# Patient Record
Sex: FEMALE | Race: WHITE | NOT HISPANIC OR LATINO | Employment: UNEMPLOYED | URBAN - METROPOLITAN AREA
[De-identification: names, ages, dates, MRNs, and addresses within clinical notes are randomized per-mention and may not be internally consistent; named-entity substitution may affect disease eponyms.]

---
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
## 2020-12-23 ENCOUNTER — HISTORICAL (OUTPATIENT)
Dept: LAB | Facility: HOSPITAL | Age: 20
End: 2020-12-23

## 2020-12-23 LAB
ABS NEUT (OLG): 2.3 X10(3)/MCL (ref 2.1–9.2)
ALBUMIN SERPL-MCNC: 3.6 GM/DL (ref 3.5–5)
ALBUMIN/GLOB SERPL: 1.1 RATIO (ref 1.1–2)
ALP SERPL-CCNC: 60 UNIT/L (ref 40–150)
ALT SERPL-CCNC: 12 UNIT/L (ref 0–55)
AST SERPL-CCNC: 15 UNIT/L (ref 5–34)
BASOPHILS # BLD AUTO: 0 X10(3)/MCL (ref 0–0.2)
BASOPHILS NFR BLD AUTO: 1 %
BILIRUB SERPL-MCNC: 0.4 MG/DL
BILIRUBIN DIRECT+TOT PNL SERPL-MCNC: 0.1 MG/DL (ref 0–0.5)
BILIRUBIN DIRECT+TOT PNL SERPL-MCNC: 0.3 MG/DL (ref 0–0.8)
BUN SERPL-MCNC: 6.9 MG/DL (ref 7–18.7)
CALCIUM SERPL-MCNC: 9.2 MG/DL (ref 8.4–10.2)
CHLORIDE SERPL-SCNC: 107 MMOL/L (ref 98–107)
CHOLEST SERPL-MCNC: 197 MG/DL
CHOLEST/HDLC SERPL: 4 {RATIO} (ref 0–5)
CO2 SERPL-SCNC: 24 MMOL/L (ref 22–29)
CREAT SERPL-MCNC: 0.76 MG/DL (ref 0.55–1.02)
EOSINOPHIL # BLD AUTO: 0.2 X10(3)/MCL (ref 0–0.9)
EOSINOPHIL NFR BLD AUTO: 5 %
ERYTHROCYTE [DISTWIDTH] IN BLOOD BY AUTOMATED COUNT: 13.2 % (ref 11.5–17)
GLOBULIN SER-MCNC: 3.2 GM/DL (ref 2.4–3.5)
GLUCOSE SERPL-MCNC: 95 MG/DL (ref 74–100)
HCT VFR BLD AUTO: 40.9 % (ref 37–47)
HDLC SERPL-MCNC: 45 MG/DL (ref 35–60)
HGB BLD-MCNC: 13 GM/DL (ref 12–16)
IMM GRANULOCYTES # BLD AUTO: 0.01 % (ref 0–0.02)
IMM GRANULOCYTES NFR BLD AUTO: 0.2 % (ref 0–0.43)
LDLC SERPL CALC-MCNC: 122 MG/DL (ref 50–140)
LYMPHOCYTES # BLD AUTO: 1.8 X10(3)/MCL (ref 0.6–4.6)
LYMPHOCYTES NFR BLD AUTO: 38 %
MCH RBC QN AUTO: 28 PG (ref 27–31)
MCHC RBC AUTO-ENTMCNC: 31.8 GM/DL (ref 33–36)
MCV RBC AUTO: 88.1 FL (ref 80–94)
MONOCYTES # BLD AUTO: 0.3 X10(3)/MCL (ref 0.1–1.3)
MONOCYTES NFR BLD AUTO: 6 %
NEUTROPHILS # BLD AUTO: 2.3 X10(3)/MCL (ref 1.4–7.9)
NEUTROPHILS NFR BLD AUTO: 49 %
PLATELET # BLD AUTO: 321 X10(3)/MCL (ref 130–400)
PMV BLD AUTO: 8.8 FL (ref 9.4–12.4)
POTASSIUM SERPL-SCNC: 4.3 MMOL/L (ref 3.5–5.1)
PROT SERPL-MCNC: 6.8 GM/DL (ref 6.4–8.3)
RBC # BLD AUTO: 4.64 X10(6)/MCL (ref 4.2–5.4)
SODIUM SERPL-SCNC: 141 MMOL/L (ref 136–145)
TRIGL SERPL-MCNC: 150 MG/DL (ref 37–140)
TSH SERPL-ACNC: 0.96 UIU/ML (ref 0.35–4.94)
VLDLC SERPL CALC-MCNC: 30 MG/DL
WBC # SPEC AUTO: 4.7 X10(3)/MCL (ref 4.5–11.5)

## 2021-09-28 LAB
BILIRUB SERPL-MCNC: NEGATIVE MG/DL
BLOOD URINE, POC: NORMAL
CLARITY, POC UA: CLEAR
COLOR, POC UA: NORMAL
GLUCOSE UR QL STRIP: NEGATIVE
KETONES UR QL STRIP: NEGATIVE
LEUKOCYTE EST, POC UA: NORMAL
NITRITE, POC UA: NEGATIVE
PH, POC UA: 7
POC BETA-HCG (QUAL): NEGATIVE
PROTEIN, POC: NEGATIVE
SPECIFIC GRAVITY, POC UA: 1
UROBILINOGEN, POC UA: NORMAL

## 2022-03-28 LAB
HIGH RISK HPV 16 (PRECISION): NEGATIVE
HIGH RISK HPV 18/45 (PRECISION): NEGATIVE
PAP RECOMMENDATION EXT: ABNORMAL
PAP SMEAR: ABNORMAL

## 2022-04-11 ENCOUNTER — HISTORICAL (OUTPATIENT)
Dept: ADMINISTRATIVE | Facility: HOSPITAL | Age: 22
End: 2022-04-11
Payer: COMMERCIAL

## 2022-04-28 VITALS
OXYGEN SATURATION: 99 % | SYSTOLIC BLOOD PRESSURE: 117 MMHG | DIASTOLIC BLOOD PRESSURE: 80 MMHG | WEIGHT: 174.38 LBS | HEIGHT: 66 IN | BODY MASS INDEX: 28.03 KG/M2

## 2022-05-17 RX ORDER — LISDEXAMFETAMINE DIMESYLATE 30 MG/1
CAPSULE ORAL
COMMUNITY
Start: 2022-04-12 | End: 2022-05-17 | Stop reason: SDUPTHER

## 2022-05-17 RX ORDER — LISDEXAMFETAMINE DIMESYLATE 30 MG/1
30 CAPSULE ORAL EVERY MORNING
Qty: 30 CAPSULE | Refills: 0 | Status: SHIPPED | OUTPATIENT
Start: 2022-05-17 | End: 2022-06-27 | Stop reason: SDUPTHER

## 2022-05-17 NOTE — TELEPHONE ENCOUNTER
----- Message from Sydnie Anaya MA sent at 5/17/2022 11:40 AM CDT -----  Regarding: refill  Patient is requesting a refill on Vyvanse 30 mg at [facility].  She has a scheduled telemedicine visit on 5/23/22.

## 2022-05-23 ENCOUNTER — OFFICE VISIT (OUTPATIENT)
Dept: FAMILY MEDICINE | Facility: CLINIC | Age: 22
End: 2022-05-23
Payer: COMMERCIAL

## 2022-05-23 DIAGNOSIS — I47.10 SVT (SUPRAVENTRICULAR TACHYCARDIA): Primary | ICD-10-CM

## 2022-05-23 DIAGNOSIS — F90.9 ATTENTION DEFICIT HYPERACTIVITY DISORDER (ADHD), UNSPECIFIED ADHD TYPE: ICD-10-CM

## 2022-05-23 DIAGNOSIS — F41.1 ANXIETY STATE: ICD-10-CM

## 2022-05-23 PROBLEM — R30.0 DYSURIA: Status: ACTIVE | Noted: 2021-07-30

## 2022-05-23 PROCEDURE — 99213 PR OFFICE/OUTPT VISIT, EST, LEVL III, 20-29 MIN: ICD-10-PCS | Mod: 95,,, | Performed by: NURSE PRACTITIONER

## 2022-05-23 PROCEDURE — 1159F MED LIST DOCD IN RCRD: CPT | Mod: CPTII,95,, | Performed by: NURSE PRACTITIONER

## 2022-05-23 PROCEDURE — 99213 OFFICE O/P EST LOW 20 MIN: CPT | Mod: 95,,, | Performed by: NURSE PRACTITIONER

## 2022-05-23 PROCEDURE — 1160F PR REVIEW ALL MEDS BY PRESCRIBER/CLIN PHARMACIST DOCUMENTED: ICD-10-PCS | Mod: CPTII,95,, | Performed by: NURSE PRACTITIONER

## 2022-05-23 PROCEDURE — 1160F RVW MEDS BY RX/DR IN RCRD: CPT | Mod: CPTII,95,, | Performed by: NURSE PRACTITIONER

## 2022-05-23 PROCEDURE — 1159F PR MEDICATION LIST DOCUMENTED IN MEDICAL RECORD: ICD-10-PCS | Mod: CPTII,95,, | Performed by: NURSE PRACTITIONER

## 2022-05-23 RX ORDER — NORETHINDRONE ACETATE AND ETHINYL ESTRADIOL AND FERROUS FUMARATE 1MG-20(24)
KIT ORAL
COMMUNITY
Start: 2022-05-06 | End: 2024-04-01

## 2022-05-23 RX ORDER — NEBIVOLOL 10 MG/1
TABLET ORAL
COMMUNITY
Start: 2022-02-23 | End: 2022-08-23

## 2022-05-23 RX ORDER — ALPRAZOLAM 0.25 MG/1
TABLET ORAL
COMMUNITY
Start: 2022-02-23 | End: 2024-01-10 | Stop reason: SDUPTHER

## 2022-05-23 NOTE — PROGRESS NOTES
TELEMEDICINE VISIT     Patient ID: Mau Hebert is a 22 y.o. female.  MRN: [id number]  : 2000    Subjective:        TELEMEDICINE  The patient location is: home  The chief complaint leading to consultation is: 3mth f/u and ADHD     This is a 22-year-old white female who was seen today via telemedicine for three-month follow-up.  Patient has a history of ADHD, SVT, anxiety.  Patient states doing well with her medications and denies any side effects.  States she actually never started the Bystolic yet but plans on starting it this Friday.  She was afraid to be sleepy with it.      Visit type: Virtual visit with synchronous audio and video    Total time spent with patient: 20 minutes  20 minutes of total time spent on the encounter, which includes face to face time and non-face to face time preparing to see the patient (eg, review of tests), obtaining and/or reviewing separately obtained history, documenting clinical information in the electronic or other health record, independently interpreting results (not separately reported) and communicating results to the patient/family/caregiver, or care coordination (not separately reported).    Each patient to whom he or she provides medical services by telemedicine is:  (1) informed of the relationship between the physician and patient and the respective role of any other health care provider with respect to management of the patient; and (2) notified that he or she may decline to receive medical services by telemedicine and may withdraw from such care at any time.    HPI: HPI     Health maintenance reviewed with the patient.  Health maintenance completed:  The patient has no Health Maintenance topics of status Not Due   Health maintenance due:  Health Maintenance Due   Topic Date Due    Hepatitis C Screening  Never done    HIV Screening  Never done    Chlamydia Screening  Never done    Pap Smear  Never done    TETANUS VACCINE  2021    COVID-19  Vaccine (3 - Booster for Moderna series) 06/05/2022      ROS:  Review of Systems   History:     Past Medical History:   Diagnosis Date    ADHD (attention deficit hyperactivity disorder)     Bilateral arm fractures     History of laparoscopic appendectomy     MRSA infection     SVT (supraventricular tachycardia)     Vasovagal syncope       Past Surgical History:   Procedure Laterality Date    INCISION AND DRAINAGE OF WOUND       Family History   Problem Relation Age of Onset    No Known Problems Mother     No Known Problems Father     No Known Problems Brother     No Known Problems Maternal Grandmother     No Known Problems Maternal Grandfather     No Known Problems Paternal Grandmother     No Known Problems Paternal Grandfather     Congenital heart disease Neg Hx     Pacemaker/defibrilator Neg Hx     Arrhythmia Neg Hx     Early death Neg Hx     Heart attacks under age 50 Neg Hx     Deafness Neg Hx     Long QT syndrome Neg Hx     Seizures Neg Hx       Social History     Tobacco Use    Smoking status: Current Every Day Smoker    Smokeless tobacco: Never Used   Substance and Sexual Activity    Alcohol use: Yes     Alcohol/week: 0.0 standard drinks    Drug use: Not Currently     Types: Marijuana    Sexual activity: Yes          Allergies: Review of patient's allergies indicates:  Not on File  Objective:   There were no vitals filed for this visit.      Physical Examination: Physical exam was not performed during this virtual visit.  Physical Exam    Labs:   Diagnostic Tests:  Significant Imaging: I have reviewed and interpreted all pertinent imaging results/findings.  CT Abdomen Pelvis  Without Contrast  INDICATION: Other (please specify)  COMPARISON:  Abdominopelvic CT 6/26/2019     TECHNIQUE:   CT of the abdomen and pelvis WITHOUT intravenous contrast. The abdomen  and pelvis were scanned utilizing a multidetector helical scanner from  the diaphragm to the lesser trochanter without the  administration of  intravenous or oral contrast. Coronal and sagittal reformations were  obtained.  Automatic exposure control was utilized to limit radiation dose.     Radiation Dose:  Total DLP: 718 mGy*cm     DISCUSSION:  Lack of intravenous contrast limits sensitivity for detection of solid  organ and vascular pathology.     LOWER THORAX: Lung bases clear. No pericardial or pleural effusions.     HEPATOBILIARY: Liver size within normal limits. No focal hepatic  lesions. No biliary ductal dilatation. Gallbladder within normal  limits. No radiodense gallstones.  SPLEEN: Spleen size within normal limits. No focal splenic lesions.     PANCREAS: No focal masses or ductal dilatation.  ADRENALS: No adrenal nodules.  KIDNEYS/URETERS: No hydronephrosis or radiodense stones. No  perinephric inflammatory changes.     PELVIC ORGANS/BLADDER: Unremarkable.  PERITONEUM / RETROPERITONEUM: No free intraperitoneal air. Trace free  fluid in the pelvis is presumed physiologic.     LYMPH NODES: No lymphadenopathy.  VESSELS: Unremarkable.     GI TRACT: No distention or wall thickening. Appendix not visualized,  presumed surgically absent.     BONES AND SOFT TISSUES: Soft tissues within normal limits. No bony  destructive lesions. No acute bony pathology.     IMPRESSION:      No radiodense nephroureteral stones. No hydronephrosis      Electronically Signed By: Noemi eMssina MD  Date/Time Signed: 02/26/2021 10:39      Laboratory Data:  All pertinent labs have been reviewed.  I have reviewed the following records today:     Details:   [x] Labs [] Internal  [] External    [] Micro [] Internal  [] External    [] Pathology [] Internal  [] External    [x] Imaging [] Internal  [] External    [x] Cardiology Procedures [] Internal  [] External    [x] Provider Records [] Internal  [] External    [] Other [] Internal  [] External        Medications:     Medication List with Changes/Refills   Current Medications    ALPRAZOLAM (XANAX) 0.25  MG TABLET        JUNEL FE 24 1 MG-20 MCG (24)/75 MG (4) PER TABLET        NEBIVOLOL (BYSTOLIC) 10 MG TAB        VYVANSE 30 MG CAPSULE    Take 1 capsule (30 mg total) by mouth every morning.   Discontinued Medications    ESCITALOPRAM OXALATE (LEXAPRO) 20 MG TABLET    Take 20 mg by mouth once daily.    NORETHINDRONE-E.ESTRADIOL-IRON (LO LOESTRIN FE ORAL)    Take 1 tablet by mouth once daily.     Assessment:     1. SVT (supraventricular tachycardia)    2. Attention deficit hyperactivity disorder (ADHD), unspecified ADHD type      Plan:   Mau was seen today for 3mth f/u and adhd.    Diagnoses and all orders for this visit:    SVT (supraventricular tachycardia)    Attention deficit hyperactivity disorder (ADHD), unspecified ADHD type      Continue current  meds. Encouraged to start Bystolic as prescribed. Follow up 3 months with virtual visit.     Follow Up:   Follow up in about 3 months (around 8/23/2022) for Virtual Visit.    I spent greater than 20 minutes today both in chart review and greater than 50% of that time in discussion with the patient regarding health maintenance, diagnoses, diagnostic tests, medications, treatments, symptom management, expected results and adverse effects. Patient verbalized understanding and all questions were answered.

## 2022-08-16 ENCOUNTER — TELEPHONE (OUTPATIENT)
Dept: FAMILY MEDICINE | Facility: CLINIC | Age: 22
End: 2022-08-16
Payer: COMMERCIAL

## 2022-08-16 NOTE — TELEPHONE ENCOUNTER
1. Are there any outstanding tasks in patient's chart?    n  2. Do we have outstanding/pending referrals?    n  3. Has the patient been seen in an ER, Urgent Care, or admitted since last visit?    n  4. Has patient seen any other health care providers since last visit?    n  5.  Has patient had any blood work or x-rays done since last visit?  n

## 2022-08-23 ENCOUNTER — OFFICE VISIT (OUTPATIENT)
Dept: FAMILY MEDICINE | Facility: CLINIC | Age: 22
End: 2022-08-23
Payer: COMMERCIAL

## 2022-08-23 VITALS
HEIGHT: 66 IN | HEART RATE: 109 BPM | DIASTOLIC BLOOD PRESSURE: 88 MMHG | SYSTOLIC BLOOD PRESSURE: 127 MMHG | WEIGHT: 168 LBS | BODY MASS INDEX: 27 KG/M2

## 2022-08-23 DIAGNOSIS — R53.83 FATIGUE, UNSPECIFIED TYPE: ICD-10-CM

## 2022-08-23 DIAGNOSIS — R00.0 SINUS TACHYCARDIA: ICD-10-CM

## 2022-08-23 DIAGNOSIS — F90.9 ATTENTION DEFICIT HYPERACTIVITY DISORDER (ADHD), UNSPECIFIED ADHD TYPE: Primary | ICD-10-CM

## 2022-08-23 PROBLEM — M47.819 OSTEOARTHRITIS OF SPINAL FACET JOINT: Status: ACTIVE | Noted: 2022-08-23

## 2022-08-23 PROBLEM — E66.9 OBESITY: Status: ACTIVE | Noted: 2022-08-23

## 2022-08-23 PROBLEM — E78.5 HYPERLIPIDEMIA: Status: ACTIVE | Noted: 2022-08-23

## 2022-08-23 PROCEDURE — 3008F BODY MASS INDEX DOCD: CPT | Mod: CPTII,95,, | Performed by: NURSE PRACTITIONER

## 2022-08-23 PROCEDURE — 3079F DIAST BP 80-89 MM HG: CPT | Mod: CPTII,95,, | Performed by: NURSE PRACTITIONER

## 2022-08-23 PROCEDURE — 1160F PR REVIEW ALL MEDS BY PRESCRIBER/CLIN PHARMACIST DOCUMENTED: ICD-10-PCS | Mod: CPTII,95,, | Performed by: NURSE PRACTITIONER

## 2022-08-23 PROCEDURE — 3008F PR BODY MASS INDEX (BMI) DOCUMENTED: ICD-10-PCS | Mod: CPTII,95,, | Performed by: NURSE PRACTITIONER

## 2022-08-23 PROCEDURE — 99213 OFFICE O/P EST LOW 20 MIN: CPT | Mod: 95,,, | Performed by: NURSE PRACTITIONER

## 2022-08-23 PROCEDURE — 3079F PR MOST RECENT DIASTOLIC BLOOD PRESSURE 80-89 MM HG: ICD-10-PCS | Mod: CPTII,95,, | Performed by: NURSE PRACTITIONER

## 2022-08-23 PROCEDURE — 1159F PR MEDICATION LIST DOCUMENTED IN MEDICAL RECORD: ICD-10-PCS | Mod: CPTII,95,, | Performed by: NURSE PRACTITIONER

## 2022-08-23 PROCEDURE — 1160F RVW MEDS BY RX/DR IN RCRD: CPT | Mod: CPTII,95,, | Performed by: NURSE PRACTITIONER

## 2022-08-23 PROCEDURE — 99213 PR OFFICE/OUTPT VISIT, EST, LEVL III, 20-29 MIN: ICD-10-PCS | Mod: 95,,, | Performed by: NURSE PRACTITIONER

## 2022-08-23 PROCEDURE — 3074F PR MOST RECENT SYSTOLIC BLOOD PRESSURE < 130 MM HG: ICD-10-PCS | Mod: CPTII,95,, | Performed by: NURSE PRACTITIONER

## 2022-08-23 PROCEDURE — 1159F MED LIST DOCD IN RCRD: CPT | Mod: CPTII,95,, | Performed by: NURSE PRACTITIONER

## 2022-08-23 PROCEDURE — 3074F SYST BP LT 130 MM HG: CPT | Mod: CPTII,95,, | Performed by: NURSE PRACTITIONER

## 2022-08-23 NOTE — ASSESSMENT & PLAN NOTE
Instructed patient that I will be stopping her Vyvanse if she does not get on a beta-blocker for her heart rate.  Patient verbalized understanding and states she will start the Bystolic this weekend.

## 2022-08-23 NOTE — PROGRESS NOTES
TELEMEDICINE VISIT     Patient ID: Mau Hebert is a 22 y.o. female.  MRN: [id number]  : 2000    Subjective:        TELEMEDICINE  The patient location is: home  The chief complaint leading to consultation is: SVT (3 month f/u/Not taking Bystolic), ADHD (3 month f/u), Fatigue (Always tired feeling, but has gotten worse over the past few months.), and Medication Refill (Vyvanse)     Visit type: Virtual visit with synchronous audio and video    Total time spent with patient: 20 minutes  20 minutes of total time spent on the encounter, which includes face to face time and non-face to face time preparing to see the patient (eg, review of tests), obtaining and/or reviewing separately obtained history, documenting clinical information in the electronic or other health record, independently interpreting results (not separately reported) and communicating results to the patient/family/caregiver, or care coordination (not separately reported).    Each patient to whom he or she provides medical services by telemedicine is:  (1) informed of the relationship between the physician and patient and the respective role of any other health care provider with respect to management of the patient; and (2) notified that he or she may decline to receive medical services by telemedicine and may withdraw from such care at any time.    HPI: HPI   This is a 22-year-old white female who is seen today via telemedicine for a 3 month follow-up for ADHD, sinus tachycardia.  Patient states that she never started the Bystolic still because she does not want to be sleepy from it.  Patient states she is already very tired all the time any ways.  Feels like she is just exhausted all day long.    Health maintenance reviewed with the patient.  Health maintenance completed:  Health Maintenance Topics with due status: Not Due       Topic Last Completion Date    Influenza Vaccine 10/21/2021    TETANUS VACCINE 2021      Health  maintenance due:  Health Maintenance Due   Topic Date Due    Hepatitis C Screening  Never done    Pneumococcal Vaccines (Age 0-64) (1 - PCV) Never done    HIV Screening  Never done    Chlamydia Screening  Never done    Pap Smear  Never done    COVID-19 Vaccine (3 - Booster for Moderna series) 06/05/2022      ROS:  Review of Systems   Constitutional: Positive for fatigue. Negative for activity change and unexpected weight change.   HENT: Negative.  Negative for hearing loss, rhinorrhea and trouble swallowing.    Eyes: Negative.  Negative for discharge and visual disturbance.   Respiratory: Negative.  Negative for chest tightness and wheezing.    Cardiovascular: Negative.  Negative for chest pain and palpitations.   Gastrointestinal: Negative.  Negative for blood in stool, constipation, diarrhea and vomiting.   Endocrine: Negative.  Negative for polydipsia and polyuria.   Genitourinary: Negative.  Negative for difficulty urinating, dysuria, hematuria and menstrual problem.   Musculoskeletal: Negative.  Negative for arthralgias, joint swelling and neck pain.   Integumentary:  Negative.   Allergic/Immunologic: Negative.    Neurological: Negative.  Negative for weakness and headaches.   Hematological: Negative.    Psychiatric/Behavioral: Negative.  Negative for confusion and dysphoric mood.   All other systems reviewed and are negative.     Complete ROS negative except as stated in HPI  History:     Past Medical History:   Diagnosis Date    ADHD (attention deficit hyperactivity disorder)     Bilateral arm fractures     History of laparoscopic appendectomy     MRSA infection     SVT (supraventricular tachycardia)     Vasovagal syncope       Past Surgical History:   Procedure Laterality Date    INCISION AND DRAINAGE OF WOUND       Family History   Problem Relation Age of Onset    No Known Problems Mother     No Known Problems Father     No Known Problems Brother     No Known Problems Maternal Grandmother   "   No Known Problems Maternal Grandfather     No Known Problems Paternal Grandmother     No Known Problems Paternal Grandfather     Congenital heart disease Neg Hx     Pacemaker/defibrilator Neg Hx     Arrhythmia Neg Hx     Early death Neg Hx     Heart attacks under age 50 Neg Hx     Deafness Neg Hx     Long QT syndrome Neg Hx     Seizures Neg Hx       Social History     Tobacco Use    Smoking status: Current Every Day Smoker     Types: Vaping with nicotine    Smokeless tobacco: Never Used   Substance and Sexual Activity    Alcohol use: Yes     Alcohol/week: 0.0 standard drinks    Drug use: Not Currently     Types: Marijuana    Sexual activity: Yes          Allergies:   Review of patient's allergies indicates:   Allergen Reactions    Amitriptyline Other (See Comments)     Felt out of it. Eye would not open.     Objective:     Vitals:    08/23/22 1613   BP: 127/88   Pulse: 109   Weight: 76.2 kg (168 lb)   Height: 5' 6" (1.676 m)   PainSc: 0-No pain         Physical Examination:   Physical Exam  Vitals and nursing note reviewed.   Constitutional:       Appearance: Normal appearance.   HENT:      Head: Normocephalic and atraumatic.   Neurological:      General: No focal deficit present.      Mental Status: She is alert and oriented to person, place, and time.   Psychiatric:         Mood and Affect: Mood normal.         Behavior: Behavior normal.         Thought Content: Thought content normal.         Judgment: Judgment normal.           Medications:     Medication List with Changes/Refills   Current Medications    ALPRAZOLAM (XANAX) 0.25 MG TABLET        JUNEL FE 24 1 MG-20 MCG (24)/75 MG (4) PER TABLET        LISDEXAMFETAMINE (VYVANSE) 30 MG CAPSULE    Take 1 capsule (30 mg total) by mouth every morning.   Discontinued Medications    NEBIVOLOL (BYSTOLIC) 10 MG TAB         Assessment and Plan   1. Attention deficit hyperactivity disorder (ADHD), unspecified ADHD type  Overview:  Stable on Vyvanse 30mg " daily    Assessment & Plan:  Continue Vyvanse 30mg daily, follow up 3 months.       2. Sinus tachycardia  Overview:  Metoprolol made her very sleepy  RX Bystolic given in February 2022 - never started  August 2022 - start Bystolic or will need to stop Vyvanse     Assessment & Plan:  Instructed patient that I will be stopping her Vyvanse if she does not get on a beta-blocker for her heart rate.  Patient verbalized understanding and states she will start the Bystolic this weekend.                Follow Up:   Follow up in about 3 months (around 11/23/2022).    I spent greater than 20 minutes today both in chart review and greater than 50% of that time in discussion with the patient regarding health maintenance, diagnoses, diagnostic tests, medications, treatments, symptom management, expected results and adverse effects. Patient verbalized understanding and all questions were answered.

## 2022-09-21 ENCOUNTER — HISTORICAL (OUTPATIENT)
Dept: ADMINISTRATIVE | Facility: HOSPITAL | Age: 22
End: 2022-09-21
Payer: COMMERCIAL

## 2022-09-22 ENCOUNTER — HISTORICAL (OUTPATIENT)
Dept: ADMINISTRATIVE | Facility: HOSPITAL | Age: 22
End: 2022-09-22
Payer: COMMERCIAL

## 2022-10-07 ENCOUNTER — OFFICE VISIT (OUTPATIENT)
Dept: FAMILY MEDICINE | Facility: CLINIC | Age: 22
End: 2022-10-07
Payer: COMMERCIAL

## 2022-10-07 VITALS
HEART RATE: 72 BPM | HEIGHT: 66 IN | DIASTOLIC BLOOD PRESSURE: 66 MMHG | WEIGHT: 169.81 LBS | SYSTOLIC BLOOD PRESSURE: 102 MMHG | BODY MASS INDEX: 27.29 KG/M2 | RESPIRATION RATE: 16 BRPM | TEMPERATURE: 99 F | OXYGEN SATURATION: 100 %

## 2022-10-07 DIAGNOSIS — J06.9 UPPER RESPIRATORY TRACT INFECTION, UNSPECIFIED TYPE: Primary | ICD-10-CM

## 2022-10-07 DIAGNOSIS — R52 BODY ACHES: ICD-10-CM

## 2022-10-07 LAB
CTP QC/QA: YES
FLUAV AG NPH QL: NEGATIVE
FLUBV AG NPH QL: NEGATIVE

## 2022-10-07 PROCEDURE — 1159F MED LIST DOCD IN RCRD: CPT | Mod: CPTII,,, | Performed by: NURSE PRACTITIONER

## 2022-10-07 PROCEDURE — 3078F DIAST BP <80 MM HG: CPT | Mod: CPTII,,, | Performed by: NURSE PRACTITIONER

## 2022-10-07 PROCEDURE — 3078F PR MOST RECENT DIASTOLIC BLOOD PRESSURE < 80 MM HG: ICD-10-PCS | Mod: CPTII,,, | Performed by: NURSE PRACTITIONER

## 2022-10-07 PROCEDURE — 3074F SYST BP LT 130 MM HG: CPT | Mod: CPTII,,, | Performed by: NURSE PRACTITIONER

## 2022-10-07 PROCEDURE — 99212 PR OFFICE/OUTPT VISIT, EST, LEVL II, 10-19 MIN: ICD-10-PCS | Mod: 25,,, | Performed by: NURSE PRACTITIONER

## 2022-10-07 PROCEDURE — 3074F PR MOST RECENT SYSTOLIC BLOOD PRESSURE < 130 MM HG: ICD-10-PCS | Mod: CPTII,,, | Performed by: NURSE PRACTITIONER

## 2022-10-07 PROCEDURE — 1160F PR REVIEW ALL MEDS BY PRESCRIBER/CLIN PHARMACIST DOCUMENTED: ICD-10-PCS | Mod: CPTII,,, | Performed by: NURSE PRACTITIONER

## 2022-10-07 PROCEDURE — 87804 INFLUENZA ASSAY W/OPTIC: CPT | Mod: QW,,, | Performed by: NURSE PRACTITIONER

## 2022-10-07 PROCEDURE — 96372 PR INJECTION,THERAP/PROPH/DIAG2ST, IM OR SUBCUT: ICD-10-PCS | Mod: ,,, | Performed by: NURSE PRACTITIONER

## 2022-10-07 PROCEDURE — 87804 POCT INFLUENZA A/B: ICD-10-PCS | Mod: QW,,, | Performed by: NURSE PRACTITIONER

## 2022-10-07 PROCEDURE — 99212 OFFICE O/P EST SF 10 MIN: CPT | Mod: 25,,, | Performed by: NURSE PRACTITIONER

## 2022-10-07 PROCEDURE — 1160F RVW MEDS BY RX/DR IN RCRD: CPT | Mod: CPTII,,, | Performed by: NURSE PRACTITIONER

## 2022-10-07 PROCEDURE — 3008F PR BODY MASS INDEX (BMI) DOCUMENTED: ICD-10-PCS | Mod: CPTII,,, | Performed by: NURSE PRACTITIONER

## 2022-10-07 PROCEDURE — 1159F PR MEDICATION LIST DOCUMENTED IN MEDICAL RECORD: ICD-10-PCS | Mod: CPTII,,, | Performed by: NURSE PRACTITIONER

## 2022-10-07 PROCEDURE — 96372 THER/PROPH/DIAG INJ SC/IM: CPT | Mod: ,,, | Performed by: NURSE PRACTITIONER

## 2022-10-07 PROCEDURE — 3008F BODY MASS INDEX DOCD: CPT | Mod: CPTII,,, | Performed by: NURSE PRACTITIONER

## 2022-10-07 RX ORDER — NEBIVOLOL 10 MG/1
10 TABLET ORAL DAILY
COMMUNITY
End: 2022-11-23 | Stop reason: SDUPTHER

## 2022-10-07 RX ORDER — DEXAMETHASONE SODIUM PHOSPHATE 100 MG/10ML
10 INJECTION INTRAMUSCULAR; INTRAVENOUS
Status: COMPLETED | OUTPATIENT
Start: 2022-10-07 | End: 2022-10-07

## 2022-10-07 RX ADMIN — DEXAMETHASONE SODIUM PHOSPHATE 10 MG: 100 INJECTION INTRAMUSCULAR; INTRAVENOUS at 09:10

## 2022-11-16 ENCOUNTER — TELEPHONE (OUTPATIENT)
Dept: FAMILY MEDICINE | Facility: CLINIC | Age: 22
End: 2022-11-16
Payer: COMMERCIAL

## 2022-11-16 NOTE — TELEPHONE ENCOUNTER
No answer/no voicemail when attempted to contact patient for previsit reminder for Telemedicince visit on 11/23/22 at 3:40

## 2022-11-23 ENCOUNTER — OFFICE VISIT (OUTPATIENT)
Dept: FAMILY MEDICINE | Facility: CLINIC | Age: 22
End: 2022-11-23
Payer: COMMERCIAL

## 2022-11-23 VITALS — SYSTOLIC BLOOD PRESSURE: 126 MMHG | HEART RATE: 83 BPM | DIASTOLIC BLOOD PRESSURE: 84 MMHG

## 2022-11-23 DIAGNOSIS — F90.9 ATTENTION DEFICIT HYPERACTIVITY DISORDER (ADHD), UNSPECIFIED ADHD TYPE: Primary | ICD-10-CM

## 2022-11-23 DIAGNOSIS — F41.1 GENERALIZED ANXIETY DISORDER: ICD-10-CM

## 2022-11-23 DIAGNOSIS — R00.0 SINUS TACHYCARDIA: ICD-10-CM

## 2022-11-23 PROCEDURE — 1160F RVW MEDS BY RX/DR IN RCRD: CPT | Mod: CPTII,95,, | Performed by: NURSE PRACTITIONER

## 2022-11-23 PROCEDURE — 1160F PR REVIEW ALL MEDS BY PRESCRIBER/CLIN PHARMACIST DOCUMENTED: ICD-10-PCS | Mod: CPTII,95,, | Performed by: NURSE PRACTITIONER

## 2022-11-23 PROCEDURE — 99212 PR OFFICE/OUTPT VISIT, EST, LEVL II, 10-19 MIN: ICD-10-PCS | Mod: 95,,, | Performed by: NURSE PRACTITIONER

## 2022-11-23 PROCEDURE — 3074F PR MOST RECENT SYSTOLIC BLOOD PRESSURE < 130 MM HG: ICD-10-PCS | Mod: CPTII,95,, | Performed by: NURSE PRACTITIONER

## 2022-11-23 PROCEDURE — 1159F MED LIST DOCD IN RCRD: CPT | Mod: CPTII,95,, | Performed by: NURSE PRACTITIONER

## 2022-11-23 PROCEDURE — 3079F DIAST BP 80-89 MM HG: CPT | Mod: CPTII,95,, | Performed by: NURSE PRACTITIONER

## 2022-11-23 PROCEDURE — 3079F PR MOST RECENT DIASTOLIC BLOOD PRESSURE 80-89 MM HG: ICD-10-PCS | Mod: CPTII,95,, | Performed by: NURSE PRACTITIONER

## 2022-11-23 PROCEDURE — 1159F PR MEDICATION LIST DOCUMENTED IN MEDICAL RECORD: ICD-10-PCS | Mod: CPTII,95,, | Performed by: NURSE PRACTITIONER

## 2022-11-23 PROCEDURE — 3074F SYST BP LT 130 MM HG: CPT | Mod: CPTII,95,, | Performed by: NURSE PRACTITIONER

## 2022-11-23 PROCEDURE — 99212 OFFICE O/P EST SF 10 MIN: CPT | Mod: 95,,, | Performed by: NURSE PRACTITIONER

## 2022-11-23 RX ORDER — NEBIVOLOL 10 MG/1
10 TABLET ORAL DAILY
Qty: 30 TABLET | Refills: 11 | Status: SHIPPED | OUTPATIENT
Start: 2022-11-23 | End: 2023-10-03

## 2022-11-23 NOTE — PROGRESS NOTES
TELEMEDICINE VISIT     Patient ID: Mau Hebert is a 22 y.o. female.  MRN: [id number]  : 2000    Subjective:        TELEMEDICINE  The patient location is: home  The chief complaint leading to consultation is: ADHD (3 month f/u)     Visit type: Virtual visit with synchronous audio and video    Total time spent with patient: 15 minutes  15 minutes of total time spent on the encounter, which includes face to face time and non-face to face time preparing to see the patient (eg, review of tests), obtaining and/or reviewing separately obtained history, documenting clinical information in the electronic or other health record, independently interpreting results (not separately reported) and communicating results to the patient/family/caregiver, or care coordination (not separately reported).    Each patient to whom he or she provides medical services by telemedicine is:  (1) informed of the relationship between the physician and patient and the respective role of any other health care provider with respect to management of the patient; and (2) notified that he or she may decline to receive medical services by telemedicine and may withdraw from such care at any time.    HPI: HPI   This is a 22-year-old white female who was seen via virtual visit today for three-month follow-up for ADHD, anxiety, and sinus tachycardia.  Patient states overall she is doing well.  States that her meds are working well.  No complaints today.    Health maintenance reviewed with the patient.  Health maintenance completed:  Health Maintenance Topics with due status: Not Due       Topic Last Completion Date    TETANUS VACCINE 2021      Health maintenance due:  Health Maintenance Due   Topic Date Due    Hepatitis C Screening  Never done    Pneumococcal Vaccines (Age 0-64) (1 - PCV) Never done    HIV Screening  Never done    Chlamydia Screening  Never done    Pap Smear  Never done    COVID-19 Vaccine (3 - Booster for Moderna  series) 03/02/2022      ROS:  Review of Systems   Complete ROS negative except as stated in HPI  History:     Past Medical History:   Diagnosis Date    ADHD (attention deficit hyperactivity disorder)     Bilateral arm fractures     History of laparoscopic appendectomy     MRSA infection     SVT (supraventricular tachycardia)     Vasovagal syncope       Past Surgical History:   Procedure Laterality Date    APPENDECTOMY  2016    INCISION AND DRAINAGE OF WOUND       Family History   Problem Relation Age of Onset    Depression Mother         Multiple family memebers on both sides.    Hearing loss Father     Hyperlipidemia Father     Hypertension Father     No Known Problems Brother     Hearing loss Maternal Grandmother     No Known Problems Maternal Grandfather     No Known Problems Paternal Grandmother     Hearing loss Paternal Grandfather     Learning disabilities Brother         Aspergers    Congenital heart disease Neg Hx     Pacemaker/defibrilator Neg Hx     Arrhythmia Neg Hx     Early death Neg Hx     Heart attacks under age 50 Neg Hx     Deafness Neg Hx     Long QT syndrome Neg Hx     Seizures Neg Hx       Social History     Tobacco Use    Smoking status: Every Day     Types: Vaping with nicotine    Smokeless tobacco: Never   Substance and Sexual Activity    Alcohol use: Yes     Comment: socially    Drug use: Not Currently     Types: Marijuana    Sexual activity: Yes     Partners: Male          Allergies:   Review of patient's allergies indicates:   Allergen Reactions    Amitriptyline Other (See Comments)     Felt out of it. Eye would not open.     Objective:     Vitals:    11/23/22 1541   BP: 126/84   Pulse: 83         Physical Examination:   Physical Exam      Medications:     Medication List with Changes/Refills   Current Medications    ALPRAZOLAM (XANAX) 0.25 MG TABLET        JUNEL FE 24 1 MG-20 MCG (24)/75 MG (4) PER TABLET        LISDEXAMFETAMINE (VYVANSE) 30 MG CAPSULE    Take 1 capsule (30 mg total) by  mouth every morning.   Changed and/or Refilled Medications    Modified Medication Previous Medication    NEBIVOLOL (BYSTOLIC) 10 MG TAB nebivoloL (BYSTOLIC) 10 MG Tab       Take 1 tablet (10 mg total) by mouth once daily.    Take 10 mg by mouth once daily.     Assessment and Plan       ICD-10-CM ICD-9-CM   1. Attention deficit hyperactivity disorder (ADHD), unspecified ADHD type  F90.9 314.01   2. Sinus tachycardia  R00.0 427.89   3. Generalized anxiety disorder  F41.1 300.02     1. Attention deficit hyperactivity disorder (ADHD), unspecified ADHD type  Overview:  DX as child  Stable on Vyvanse 30mg daily    Assessment & Plan:  Stable, continue Vyvanse 30 mg daily, follow-up 3 months with virtual visit.      2. Sinus tachycardia  Overview:  Metoprolol made her very sleepy  RX Bystolic given in February 2022 - never started  August 2022 - start Bystolic or will need to stop Vyvanse     Assessment & Plan:  Improved since starting the Bystolic.  I did explain to patient that I can see that she is only filled the Bystolic twice, once in February and once in June.  Patient does admit that she misses some doses but is trying to take it regularly.  States heart rate is much better now that she is on the Bystolic.  Follow-up 3 months with virtual visit.    Orders:  -     nebivoloL (BYSTOLIC) 10 MG Tab; Take 1 tablet (10 mg total) by mouth once daily.  Dispense: 30 tablet; Refill: 11    3. Generalized anxiety disorder  Overview:  Lexapro and Viibryd in past    Current - Xanax 0.25 mg BID prn     Assessment & Plan:  Stable, continue Xanax as needed.  Follow-up 3 months with virtual visit.              Follow Up:   Follow up in about 3 months (around 2/23/2023) for Virtual Visit.    I spent greater than 15 minutes today both in chart review and greater than 50% of that time in discussion with the patient regarding health maintenance, diagnoses, diagnostic tests, medications, treatments, symptom management, expected results and  adverse effects. Patient verbalized understanding and all questions were answered.

## 2022-11-23 NOTE — ASSESSMENT & PLAN NOTE
Improved since starting the Bystolic.  I did explain to patient that I can see that she is only filled the Bystolic twice, once in February and once in June.  Patient does admit that she misses some doses but is trying to take it regularly.  States heart rate is much better now that she is on the Bystolic.  Follow-up 3 months with virtual visit.

## 2023-02-20 ENCOUNTER — TELEPHONE (OUTPATIENT)
Dept: FAMILY MEDICINE | Facility: CLINIC | Age: 23
End: 2023-02-20
Payer: COMMERCIAL

## 2023-02-23 ENCOUNTER — DOCUMENTATION ONLY (OUTPATIENT)
Dept: ADMINISTRATIVE | Facility: HOSPITAL | Age: 23
End: 2023-02-23
Payer: COMMERCIAL

## 2023-02-28 DIAGNOSIS — Z00.00 ANNUAL PHYSICAL EXAM: Primary | ICD-10-CM

## 2023-02-28 DIAGNOSIS — Z11.59 NEED FOR HEPATITIS C SCREENING TEST: ICD-10-CM

## 2023-02-28 DIAGNOSIS — Z11.4 SCREENING FOR HIV (HUMAN IMMUNODEFICIENCY VIRUS): ICD-10-CM

## 2023-03-06 ENCOUNTER — TELEPHONE (OUTPATIENT)
Dept: FAMILY MEDICINE | Facility: CLINIC | Age: 23
End: 2023-03-06
Payer: COMMERCIAL

## 2023-03-06 DIAGNOSIS — F41.1 GENERALIZED ANXIETY DISORDER: Primary | ICD-10-CM

## 2023-03-06 RX ORDER — VILAZODONE HYDROCHLORIDE 20 MG/1
20 TABLET ORAL DAILY
Qty: 30 TABLET | Refills: 11 | Status: SHIPPED | OUTPATIENT
Start: 2023-03-06 | End: 2023-04-05 | Stop reason: SDUPTHER

## 2023-03-06 NOTE — TELEPHONE ENCOUNTER
Pt called stating that her anxiety has gotten bad lately and she would like to get back on the Viibryd.

## 2023-03-06 NOTE — TELEPHONE ENCOUNTER
----- Message from April Stewart sent at 3/6/2023 10:52 AM CST -----  Regarding: med advice  .Type:  Needs Medical Advice    Who Called: Patient  Symptoms (please be specific): anxiety   How long has patient had these symptoms: 1 week  Pharmacy name and phone #:    Would the patient rather a call back or a response via [facility]? Call back  Best Call Back Number: [phone]  Additional Information: Patient is scheduled for 3/28 for a wellness but currently is having bad anxiety and would like to be seen earlier for possible relief.

## 2023-03-06 NOTE — TELEPHONE ENCOUNTER
----- Message from Missy Juarez sent at 3/6/2023  2:43 PM CST -----  Regarding: Patient Returning Call  .Type:  Patient Returning Call    Who Called:pt  Who Left Message for Patient:nurse  Does the patient know what this is regarding?:medication  Would the patient rather a call back or a response via [facility]?   Best Call Back Number: [phone]  Additional Information: pt stated she just missed a call from the office. Please call pt back.

## 2023-03-21 ENCOUNTER — TELEPHONE (OUTPATIENT)
Dept: FAMILY MEDICINE | Facility: CLINIC | Age: 23
End: 2023-03-21
Payer: COMMERCIAL

## 2023-03-21 NOTE — TELEPHONE ENCOUNTER
Are there any outstanding task in patient chart?  n    2. Do we have outstanding/pending referrals?  n    3. Has the patient been seen in an ER, Urgent Care, or admitted since last visit?  n    4. Has patient seen any other healthcare providers since last visit?  n    5. Has patient had any blood work or xrays done since last visit?  n

## 2023-03-22 LAB
% NEUTROPHILS (OHS): 57 %
ALBUMIN SERPL BCP-MCNC: 4.5 G/DL (ref 3.9–5)
ALBUMIN/GLOB SERPL ELPH: 1.7 {RATIO} (ref 1.2–2.2)
ALP SERPL-CCNC: 59 U/L (ref 44–121)
ALT SERPL W P-5'-P-CCNC: 29 U/L (ref 0–32)
AST SERPL-CCNC: 36 U/L (ref 0–40)
BASOPHILS NFR BLD: 0.1 K/UL (ref 0–0.2)
BASOPHILS NFR BLD: 1 %
BILIRUB SERPL-MCNC: 0.3 MG/DL (ref 0–1.2)
BUN BLD-MCNC: 8 MG/DL (ref 6–20)
BUN/CREAT RATIO: 10 (ref 9–23)
CALCIUM SERPL-MCNC: 9.7 MG/DL (ref 8.7–10.2)
CHLORIDE: 100 MMOL/L (ref 96–106)
CHOLEST SERPL-MSCNC: 231 MG/DL (ref 100–199)
CO2 SERPL-SCNC: 20 MMOL/L (ref 20–29)
CREAT SERPL-MCNC: 0.8 MG/DL (ref 0.6–1)
EGFR: 101 ML/MIN/1.73 (ref 59–?)
EOSINOPHIL NFR BLD: 0.1 K/UL (ref 0–0.4)
EOSINOPHIL NFR BLD: 3 %
GLOBULIN SER CALC-MCNC: 2.6 G/DL (ref 1.5–4.5)
GLUCOSE: 131 MG/DL (ref 70–99)
HBA1C MFR BLD: 5.2 % (ref 4.8–5.6)
HCT VFR BLD AUTO: 40.7 % (ref 34–46.6)
HCV AB SERPL QL IA: NEGATIVE
HDLC SERPL-MCNC: 54 MG/DL (ref 39–?)
HGB BLD-MCNC: 13 G/DL (ref 11.1–15.9)
HIV 1+2 AB+HIV1 P24 AG SERPL QL IA: NONREACTIVE
IMM GRANULOCYTES NFR BLD AUTO: 0 %
LDLC SERPL CALC-MCNC: 146 MG/DL (ref 0–99)
LYMPH #: 1.7 K/UL (ref 0.7–3.1)
LYMPH%: 34 %
MCH RBC QN AUTO: 28 PG (ref 26.6–33)
MCHC RBC AUTO-ENTMCNC: 31.9 G/DL (ref 31.5–35.7)
MCV RBC AUTO: 88 FL (ref 79–97)
MONO #: 0.3 K/UL (ref 0.1–0.9)
MONO%: 5 %
NEUTROPHILS - ABS (DIFF): 3 /ΜL (ref 1–7)
PLATELET # BLD AUTO: 325 K/UL (ref 150–450)
POTASSIUM: 5.2 MMOL/L (ref 3.5–5.2)
RBC # BLD AUTO: 4.65 M/UL (ref 3.77–5.28)
RDW-CV: 13.6 % (ref 11.7–15.4)
SODIUM: 137 MMOL/L (ref 134–144)
TOTAL PROTEIN: 7.1 G/DL (ref 6–8.5)
TRIGL SERPL-MCNC: 172 MG/DL (ref 0–149)
TSH: 0.73 UIU/ML (ref 0.45–4.5)
VLDLC SERPL-MCNC: 31 MG/DL (ref 5–40)
WBC: 4.9 K/UL (ref 3.4–10.8)

## 2023-03-28 ENCOUNTER — OFFICE VISIT (OUTPATIENT)
Dept: FAMILY MEDICINE | Facility: CLINIC | Age: 23
End: 2023-03-28
Payer: COMMERCIAL

## 2023-03-28 ENCOUNTER — TELEPHONE (OUTPATIENT)
Dept: FAMILY MEDICINE | Facility: CLINIC | Age: 23
End: 2023-03-28

## 2023-03-28 VITALS
TEMPERATURE: 98 F | WEIGHT: 180.63 LBS | HEART RATE: 72 BPM | BODY MASS INDEX: 29.03 KG/M2 | DIASTOLIC BLOOD PRESSURE: 76 MMHG | OXYGEN SATURATION: 98 % | SYSTOLIC BLOOD PRESSURE: 112 MMHG | RESPIRATION RATE: 16 BRPM | HEIGHT: 66 IN

## 2023-03-28 DIAGNOSIS — Z11.4 SCREENING FOR HIV (HUMAN IMMUNODEFICIENCY VIRUS): ICD-10-CM

## 2023-03-28 DIAGNOSIS — S13.4XXA WHIPLASH INJURY TO NECK, INITIAL ENCOUNTER: ICD-10-CM

## 2023-03-28 DIAGNOSIS — Z00.00 ANNUAL PHYSICAL EXAM: Primary | ICD-10-CM

## 2023-03-28 DIAGNOSIS — F17.290 OTHER TOBACCO PRODUCT NICOTINE DEPENDENCE, UNCOMPLICATED: ICD-10-CM

## 2023-03-28 DIAGNOSIS — E78.5 HYPERLIPIDEMIA, UNSPECIFIED HYPERLIPIDEMIA TYPE: ICD-10-CM

## 2023-03-28 DIAGNOSIS — F41.1 GENERALIZED ANXIETY DISORDER: ICD-10-CM

## 2023-03-28 DIAGNOSIS — F90.9 ATTENTION DEFICIT HYPERACTIVITY DISORDER (ADHD), UNSPECIFIED ADHD TYPE: ICD-10-CM

## 2023-03-28 DIAGNOSIS — H61.91 SKIN LESION OF RIGHT EXTERNAL EAR: ICD-10-CM

## 2023-03-28 DIAGNOSIS — Z11.59 NEED FOR HEPATITIS C SCREENING TEST: ICD-10-CM

## 2023-03-28 DIAGNOSIS — R00.0 SINUS TACHYCARDIA: ICD-10-CM

## 2023-03-28 PROBLEM — F17.200 NICOTINE DEPENDENCE, UNCOMPLICATED: Status: ACTIVE | Noted: 2023-03-28

## 2023-03-28 PROCEDURE — 1160F RVW MEDS BY RX/DR IN RCRD: CPT | Mod: CPTII,,, | Performed by: NURSE PRACTITIONER

## 2023-03-28 PROCEDURE — 3008F BODY MASS INDEX DOCD: CPT | Mod: CPTII,,, | Performed by: NURSE PRACTITIONER

## 2023-03-28 PROCEDURE — 3044F PR MOST RECENT HEMOGLOBIN A1C LEVEL <7.0%: ICD-10-PCS | Mod: CPTII,,, | Performed by: NURSE PRACTITIONER

## 2023-03-28 PROCEDURE — 99395 PR PREVENTIVE VISIT,EST,18-39: ICD-10-PCS | Mod: ,,, | Performed by: NURSE PRACTITIONER

## 2023-03-28 PROCEDURE — 1160F PR REVIEW ALL MEDS BY PRESCRIBER/CLIN PHARMACIST DOCUMENTED: ICD-10-PCS | Mod: CPTII,,, | Performed by: NURSE PRACTITIONER

## 2023-03-28 PROCEDURE — 3008F PR BODY MASS INDEX (BMI) DOCUMENTED: ICD-10-PCS | Mod: CPTII,,, | Performed by: NURSE PRACTITIONER

## 2023-03-28 PROCEDURE — 1159F MED LIST DOCD IN RCRD: CPT | Mod: CPTII,,, | Performed by: NURSE PRACTITIONER

## 2023-03-28 PROCEDURE — 3044F HG A1C LEVEL LT 7.0%: CPT | Mod: CPTII,,, | Performed by: NURSE PRACTITIONER

## 2023-03-28 PROCEDURE — 99395 PREV VISIT EST AGE 18-39: CPT | Mod: ,,, | Performed by: NURSE PRACTITIONER

## 2023-03-28 PROCEDURE — 3074F SYST BP LT 130 MM HG: CPT | Mod: CPTII,,, | Performed by: NURSE PRACTITIONER

## 2023-03-28 PROCEDURE — 1159F PR MEDICATION LIST DOCUMENTED IN MEDICAL RECORD: ICD-10-PCS | Mod: CPTII,,, | Performed by: NURSE PRACTITIONER

## 2023-03-28 PROCEDURE — 3078F PR MOST RECENT DIASTOLIC BLOOD PRESSURE < 80 MM HG: ICD-10-PCS | Mod: CPTII,,, | Performed by: NURSE PRACTITIONER

## 2023-03-28 PROCEDURE — 3074F PR MOST RECENT SYSTOLIC BLOOD PRESSURE < 130 MM HG: ICD-10-PCS | Mod: CPTII,,, | Performed by: NURSE PRACTITIONER

## 2023-03-28 PROCEDURE — 3078F DIAST BP <80 MM HG: CPT | Mod: CPTII,,, | Performed by: NURSE PRACTITIONER

## 2023-03-28 NOTE — ASSESSMENT & PLAN NOTE
Patient will return to clinic in 6 months to cryo to lesion.  Offered patient sooner appointment, she would prefer to wait.
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
## 2023-03-28 NOTE — TELEPHONE ENCOUNTER
----- Message from Dede Wolff sent at 3/27/2023 10:02 AM CDT -----  .Type:  Needs Medical Advice    Who Called: pt  Symptoms (please be specific):    How long has patient had these symptoms:   Pharmacy name and phone #:   Would the patient rather a call back or a response via [facility]? C/b  Best Call Back Number: [phone]  Additional Information: pt calling to see if the clinic her labs results for her appt tomorrow from [facility]

## 2023-03-28 NOTE — ASSESSMENT & PLAN NOTE
Patient does report some continued right upper extremity fatigue but no pain or numbness.  Encouraged to continue to monitor symptoms and we can try some physical therapy for any worsening.  Of note, she did have a CT scan of her cervical spine completed back in 2020 that was normal.

## 2023-03-28 NOTE — TELEPHONE ENCOUNTER
Pt states she had labs completed at work. Will fax results to us so that we have them prior to her appt.

## 2023-03-28 NOTE — ASSESSMENT & PLAN NOTE
Total cholesterol 231, HDL 54, triglycerides 172, .  Discussed low-cholesterol diet and supplements to help reduce cholesterol.  Discussed increasing exercise.  Repeat lipid panel in 1 year.

## 2023-05-19 DIAGNOSIS — F41.1 GENERALIZED ANXIETY DISORDER: ICD-10-CM

## 2023-05-19 RX ORDER — VILAZODONE HYDROCHLORIDE 20 MG/1
20 TABLET ORAL DAILY
Qty: 30 TABLET | Refills: 11 | Status: SHIPPED | OUTPATIENT
Start: 2023-05-19 | End: 2023-05-22 | Stop reason: SDUPTHER

## 2023-05-22 DIAGNOSIS — F90.8 ATTENTION-DEFICIT HYPERACTIVITY DISORDER, OTHER TYPE: ICD-10-CM

## 2023-05-22 DIAGNOSIS — F41.1 GENERALIZED ANXIETY DISORDER: ICD-10-CM

## 2023-05-22 RX ORDER — VILAZODONE HYDROCHLORIDE 20 MG/1
20 TABLET ORAL DAILY
Qty: 90 TABLET | Refills: 3 | Status: SHIPPED | OUTPATIENT
Start: 2023-05-22 | End: 2024-05-21

## 2023-05-22 RX ORDER — LISDEXAMFETAMINE DIMESYLATE 30 MG/1
30 CAPSULE ORAL EVERY MORNING
Qty: 30 CAPSULE | Refills: 0 | Status: SHIPPED | OUTPATIENT
Start: 2023-05-22 | End: 2023-06-19 | Stop reason: SDUPTHER

## 2023-06-19 DIAGNOSIS — F90.8 ATTENTION-DEFICIT HYPERACTIVITY DISORDER, OTHER TYPE: ICD-10-CM

## 2023-06-19 RX ORDER — LISDEXAMFETAMINE DIMESYLATE 30 MG/1
30 CAPSULE ORAL EVERY MORNING
Qty: 30 CAPSULE | Refills: 0 | Status: SHIPPED | OUTPATIENT
Start: 2023-06-19 | End: 2023-07-24 | Stop reason: SDUPTHER

## 2023-06-20 ENCOUNTER — TELEPHONE (OUTPATIENT)
Dept: FAMILY MEDICINE | Facility: CLINIC | Age: 23
End: 2023-06-20
Payer: COMMERCIAL

## 2023-06-20 NOTE — TELEPHONE ENCOUNTER
No answer/no voicemail on 6/20/23 at 1:22 when attempted to contact patient for previsit call.  No labs needed prior to Telemedicine visit.

## 2023-06-28 ENCOUNTER — TELEPHONE (OUTPATIENT)
Dept: FAMILY MEDICINE | Facility: CLINIC | Age: 23
End: 2023-06-28
Payer: COMMERCIAL

## 2023-06-28 NOTE — TELEPHONE ENCOUNTER
----- Message from Dede Wolff sent at 6/28/2023 12:39 PM CDT -----  .Type:  Needs Medical Advice    Who Called: pt  Symptoms (please be specific):    How long has patient had these symptoms:    Pharmacy name and phone #:    Would the patient rather a call back or a response via [facility]?   Best Call Back Number: [phone]  Additional Information: pt for call backl to  she didn't want to say what's about

## 2023-06-28 NOTE — TELEPHONE ENCOUNTER
No show to telemedicine visit and apologized for missing her visit.  She rescheduled her telemedicine visit on 7/24/23.

## 2023-07-03 PROBLEM — Z00.00 ANNUAL PHYSICAL EXAM: Status: RESOLVED | Noted: 2023-03-28 | Resolved: 2023-07-03

## 2023-07-24 ENCOUNTER — OFFICE VISIT (OUTPATIENT)
Dept: FAMILY MEDICINE | Facility: CLINIC | Age: 23
End: 2023-07-24
Payer: COMMERCIAL

## 2023-07-24 VITALS
HEIGHT: 66 IN | DIASTOLIC BLOOD PRESSURE: 77 MMHG | HEART RATE: 89 BPM | BODY MASS INDEX: 27.64 KG/M2 | SYSTOLIC BLOOD PRESSURE: 117 MMHG | WEIGHT: 172 LBS

## 2023-07-24 DIAGNOSIS — F90.9 ATTENTION DEFICIT HYPERACTIVITY DISORDER (ADHD), UNSPECIFIED ADHD TYPE: Primary | ICD-10-CM

## 2023-07-24 DIAGNOSIS — R05.9 COUGH, UNSPECIFIED TYPE: ICD-10-CM

## 2023-07-24 DIAGNOSIS — R00.0 SINUS TACHYCARDIA: ICD-10-CM

## 2023-07-24 DIAGNOSIS — F41.1 GENERALIZED ANXIETY DISORDER: ICD-10-CM

## 2023-07-24 PROCEDURE — 3008F PR BODY MASS INDEX (BMI) DOCUMENTED: ICD-10-PCS | Mod: CPTII,95,, | Performed by: NURSE PRACTITIONER

## 2023-07-24 PROCEDURE — 3044F PR MOST RECENT HEMOGLOBIN A1C LEVEL <7.0%: ICD-10-PCS | Mod: CPTII,95,, | Performed by: NURSE PRACTITIONER

## 2023-07-24 PROCEDURE — 3078F DIAST BP <80 MM HG: CPT | Mod: CPTII,95,, | Performed by: NURSE PRACTITIONER

## 2023-07-24 PROCEDURE — 1160F PR REVIEW ALL MEDS BY PRESCRIBER/CLIN PHARMACIST DOCUMENTED: ICD-10-PCS | Mod: CPTII,95,, | Performed by: NURSE PRACTITIONER

## 2023-07-24 PROCEDURE — 99214 OFFICE O/P EST MOD 30 MIN: CPT | Mod: 95,,, | Performed by: NURSE PRACTITIONER

## 2023-07-24 PROCEDURE — 3074F SYST BP LT 130 MM HG: CPT | Mod: CPTII,95,, | Performed by: NURSE PRACTITIONER

## 2023-07-24 PROCEDURE — 3074F PR MOST RECENT SYSTOLIC BLOOD PRESSURE < 130 MM HG: ICD-10-PCS | Mod: CPTII,95,, | Performed by: NURSE PRACTITIONER

## 2023-07-24 PROCEDURE — 1159F MED LIST DOCD IN RCRD: CPT | Mod: CPTII,95,, | Performed by: NURSE PRACTITIONER

## 2023-07-24 PROCEDURE — 3044F HG A1C LEVEL LT 7.0%: CPT | Mod: CPTII,95,, | Performed by: NURSE PRACTITIONER

## 2023-07-24 PROCEDURE — 3078F PR MOST RECENT DIASTOLIC BLOOD PRESSURE < 80 MM HG: ICD-10-PCS | Mod: CPTII,95,, | Performed by: NURSE PRACTITIONER

## 2023-07-24 PROCEDURE — 1159F PR MEDICATION LIST DOCUMENTED IN MEDICAL RECORD: ICD-10-PCS | Mod: CPTII,95,, | Performed by: NURSE PRACTITIONER

## 2023-07-24 PROCEDURE — 99214 PR OFFICE/OUTPT VISIT, EST, LEVL IV, 30-39 MIN: ICD-10-PCS | Mod: 95,,, | Performed by: NURSE PRACTITIONER

## 2023-07-24 PROCEDURE — 3008F BODY MASS INDEX DOCD: CPT | Mod: CPTII,95,, | Performed by: NURSE PRACTITIONER

## 2023-07-24 PROCEDURE — 1160F RVW MEDS BY RX/DR IN RCRD: CPT | Mod: CPTII,95,, | Performed by: NURSE PRACTITIONER

## 2023-07-24 RX ORDER — LISDEXAMFETAMINE DIMESYLATE 30 MG/1
30 CAPSULE ORAL EVERY MORNING
Qty: 30 CAPSULE | Refills: 0 | Status: SHIPPED | OUTPATIENT
Start: 2023-07-24 | End: 2023-08-29 | Stop reason: SDUPTHER

## 2023-07-24 NOTE — PROGRESS NOTES
TELEMEDICINE VISIT     Patient ID: Mau Hebert is a 23 y.o. female.  MRN: [id number]  : 2000    Subjective:        TELEMEDICINE  The patient location is: home  The chief complaint leading to consultation is: ADHD (3 month f/u), Tachycardia (3 month f/u), Anxiety (3 month f/u), and Cough     Visit type: Virtual visit with synchronous audio and video    Total time spent with patient: 30 minutes  30 minutes of total time spent on the encounter, which includes face to face time and non-face to face time preparing to see the patient (eg, review of tests), obtaining and/or reviewing separately obtained history, documenting clinical information in the electronic or other health record, independently interpreting results (not separately reported) and communicating results to the patient/family/caregiver, or care coordination (not separately reported).    Each patient to whom he or she provides medical services by telemedicine is:  (1) informed of the relationship between the physician and patient and the respective role of any other health care provider with respect to management of the patient; and (2) notified that he or she may decline to receive medical services by telemedicine and may withdraw from such care at any time.    HPI: HPI   This is a 23-year-old white female who is seen today via virtual visit for three-month follow-up for ADHD, anxiety, and sinus tachycardia.  Patient reports doing well with medications and denies any side effects.  Patient does report that about 2 months ago she had mono and strep throat.  States that she started with a cough then and the cough has lingered since then.  It has gotten somewhat better but she still is coughing.  Was previously a productive cough but no longer productive.  Got nervous because 1 time when she took a deep breath, felt like a large bubble expanded in her chest.    Health maintenance reviewed with the patient.  Health maintenance  completed:  Health Maintenance Topics with due status: Not Due       Topic Last Completion Date    TETANUS VACCINE 12/13/2021    Pap Smear 03/28/2022    Influenza Vaccine 10/11/2022      Health maintenance due:  Health Maintenance Due   Topic Date Due    Chlamydia Screening  Never done      ROS:  Review of Systems   Constitutional:  Negative for activity change and unexpected weight change.   HENT:  Negative for hearing loss, rhinorrhea and trouble swallowing.    Eyes:  Negative for discharge and visual disturbance.   Respiratory:  Negative for chest tightness and wheezing.    Cardiovascular:  Negative for chest pain and palpitations.   Gastrointestinal:  Negative for blood in stool, constipation, diarrhea and vomiting.   Endocrine: Negative for polydipsia and polyuria.   Genitourinary:  Negative for difficulty urinating, dysuria, hematuria and menstrual problem.   Musculoskeletal:  Negative for arthralgias, joint swelling and neck pain.   Neurological:  Negative for weakness and headaches.   Psychiatric/Behavioral:  Negative for confusion and dysphoric mood.     Complete ROS negative except as stated in HPI  History:     Past Medical History:   Diagnosis Date    ADHD (attention deficit hyperactivity disorder)     Bilateral arm fractures     History of laparoscopic appendectomy     MRSA infection     SVT (supraventricular tachycardia)     Vasovagal syncope       Past Surgical History:   Procedure Laterality Date    APPENDECTOMY  2016    INCISION AND DRAINAGE OF WOUND       Family History   Problem Relation Age of Onset    Depression Mother         Multiple family memebers on both sides.    Hearing loss Father     Hyperlipidemia Father     Hypertension Father     No Known Problems Brother     Hearing loss Maternal Grandmother     No Known Problems Maternal Grandfather     No Known Problems Paternal Grandmother     Hearing loss Paternal Grandfather     Learning disabilities Brother         Aspergers    Congenital  "heart disease Neg Hx     Pacemaker/defibrilator Neg Hx     Arrhythmia Neg Hx     Early death Neg Hx     Heart attacks under age 50 Neg Hx     Deafness Neg Hx     Long QT syndrome Neg Hx     Seizures Neg Hx       Social History     Tobacco Use    Smoking status: Every Day     Types: Vaping with nicotine    Smokeless tobacco: Never   Substance and Sexual Activity    Alcohol use: Yes     Comment: rarely    Drug use: Not Currently     Types: Marijuana    Sexual activity: Yes     Partners: Male          Allergies:   Review of patient's allergies indicates:   Allergen Reactions    Amitriptyline Other (See Comments)     Felt out of it. Eye would not open.     Objective:     Vitals:    07/24/23 1543   BP: 117/77   Pulse: 89   Weight: 78 kg (172 lb)   Height: 5' 6" (1.676 m)         Physical Examination:   Physical Exam  Vitals and nursing note reviewed.   Constitutional:       Appearance: Normal appearance.   HENT:      Head: Normocephalic and atraumatic.   Neurological:      General: No focal deficit present.      Mental Status: She is alert and oriented to person, place, and time.   Psychiatric:         Mood and Affect: Mood normal.         Behavior: Behavior normal.         Thought Content: Thought content normal.         Judgment: Judgment normal.         Medications:     Medication List with Changes/Refills   Current Medications    ALPRAZOLAM (XANAX) 0.25 MG TABLET        JUNEL FE 24 1 MG-20 MCG (24)/75 MG (4) PER TABLET        NEBIVOLOL (BYSTOLIC) 10 MG TAB    Take 1 tablet (10 mg total) by mouth once daily.    VILAZODONE (VIIBRYD) 20 MG TAB    Take 1 tablet (20 mg total) by mouth once daily.   Changed and/or Refilled Medications    Modified Medication Previous Medication    LISDEXAMFETAMINE (VYVANSE) 30 MG CAPSULE lisdexamfetamine (VYVANSE) 30 MG capsule       Take 1 capsule (30 mg total) by mouth every morning.    Take 1 capsule (30 mg total) by mouth every morning.     Assessment and Plan       ICD-10-CM ICD-9-CM "   1. Attention deficit hyperactivity disorder (ADHD), unspecified ADHD type  F90.9 314.01   2. Generalized anxiety disorder  F41.1 300.02   3. Cough, unspecified type  R05.9 786.2   4. Attention-deficit hyperactivity disorder, other type  F90.8 314.01   5. Sinus tachycardia  R00.0 427.89     1. Attention deficit hyperactivity disorder (ADHD), unspecified ADHD type  Overview:  DX as child  Stable on Vyvanse 30mg daily    Assessment & Plan:  Stable, continue Vyvanse, follow-up 3 months.      2. Generalized anxiety disorder  Overview:  Lexapro and Viibryd in past    Current - Xanax 0.25 mg BID prn     03/06/2023 - Viibryd 20 mg daily     Assessment & Plan:  Stable, continue Viibryd, follow-up 3 months.      3. Cough, unspecified type  Comments:  Chest x-ray ordered to be completed at [facility], will call with results.  Orders:  -     X-Ray Chest PA And Lateral; Future; Expected date: 07/24/2023    4. Attention-deficit hyperactivity disorder, other type  -     lisdexamfetamine (VYVANSE) 30 MG capsule; Take 1 capsule (30 mg total) by mouth every morning.  Dispense: 30 capsule; Refill: 0    5. Sinus tachycardia  Overview:  Metoprolol made her very sleepy  RX Bystolic given in February 2022 - never started  August 2022 - start Bystolic or will need to stop Vyvanse     Assessment & Plan:  Stable, continue Bystolic 10 mg daily, follow-up 3 months.                Follow Up:   Follow up in 2 months (on 10/3/2023) for follow up.    I spent greater than 30minutes today both in chart review and greater than 50% of that time in discussion with the patient regarding health maintenance, diagnoses, diagnostic tests, medications, treatments, symptom management, expected results and adverse effects. Patient verbalized understanding and all questions were answered.

## 2023-08-14 ENCOUNTER — TELEPHONE (OUTPATIENT)
Dept: FAMILY MEDICINE | Facility: CLINIC | Age: 23
End: 2023-08-14
Payer: COMMERCIAL

## 2023-08-29 DIAGNOSIS — F90.9 ATTENTION DEFICIT HYPERACTIVITY DISORDER (ADHD), UNSPECIFIED ADHD TYPE: ICD-10-CM

## 2023-08-29 RX ORDER — LISDEXAMFETAMINE DIMESYLATE 30 MG/1
30 CAPSULE ORAL EVERY MORNING
Qty: 30 CAPSULE | Refills: 0 | Status: SHIPPED | OUTPATIENT
Start: 2023-08-29 | End: 2023-10-02 | Stop reason: SDUPTHER

## 2023-09-26 ENCOUNTER — TELEPHONE (OUTPATIENT)
Dept: FAMILY MEDICINE | Facility: CLINIC | Age: 23
End: 2023-09-26
Payer: COMMERCIAL

## 2023-09-26 NOTE — TELEPHONE ENCOUNTER
Are there any outstanding tasks in patient's chart?    Labs     2. Do we have outstanding/pending referrals?  n    3. Has the patient been seen in an ER, Urgent Care, or admitted since last visit?  n    4. Has patient seen any other health care providers since last visit?  n    5.  Has patient had any blood work or x-rays done since last visit?    n

## 2023-10-02 DIAGNOSIS — F90.9 ATTENTION DEFICIT HYPERACTIVITY DISORDER (ADHD), UNSPECIFIED ADHD TYPE: ICD-10-CM

## 2023-10-02 RX ORDER — LISDEXAMFETAMINE DIMESYLATE 30 MG/1
30 CAPSULE ORAL EVERY MORNING
Qty: 30 CAPSULE | Refills: 0 | Status: SHIPPED | OUTPATIENT
Start: 2023-10-02 | End: 2023-11-06 | Stop reason: SDUPTHER

## 2023-10-03 ENCOUNTER — OFFICE VISIT (OUTPATIENT)
Dept: FAMILY MEDICINE | Facility: CLINIC | Age: 23
End: 2023-10-03
Payer: COMMERCIAL

## 2023-10-03 VITALS
HEIGHT: 66 IN | BODY MASS INDEX: 28.93 KG/M2 | SYSTOLIC BLOOD PRESSURE: 104 MMHG | WEIGHT: 180 LBS | HEART RATE: 76 BPM | DIASTOLIC BLOOD PRESSURE: 74 MMHG | OXYGEN SATURATION: 100 % | RESPIRATION RATE: 16 BRPM | TEMPERATURE: 98 F

## 2023-10-03 DIAGNOSIS — B07.9 WART OF HAND: ICD-10-CM

## 2023-10-03 DIAGNOSIS — R00.0 SINUS TACHYCARDIA: ICD-10-CM

## 2023-10-03 DIAGNOSIS — F90.9 ATTENTION DEFICIT HYPERACTIVITY DISORDER (ADHD), UNSPECIFIED ADHD TYPE: ICD-10-CM

## 2023-10-03 DIAGNOSIS — H61.91 SKIN LESION OF RIGHT EAR: ICD-10-CM

## 2023-10-03 DIAGNOSIS — F41.1 GENERALIZED ANXIETY DISORDER: Primary | ICD-10-CM

## 2023-10-03 PROBLEM — L91.8 SKIN TAG OF EAR: Status: ACTIVE | Noted: 2023-03-28

## 2023-10-03 PROCEDURE — 3078F DIAST BP <80 MM HG: CPT | Mod: CPTII,,, | Performed by: NURSE PRACTITIONER

## 2023-10-03 PROCEDURE — 1159F PR MEDICATION LIST DOCUMENTED IN MEDICAL RECORD: ICD-10-PCS | Mod: CPTII,,, | Performed by: NURSE PRACTITIONER

## 2023-10-03 PROCEDURE — 3044F PR MOST RECENT HEMOGLOBIN A1C LEVEL <7.0%: ICD-10-PCS | Mod: CPTII,,, | Performed by: NURSE PRACTITIONER

## 2023-10-03 PROCEDURE — 3008F BODY MASS INDEX DOCD: CPT | Mod: CPTII,,, | Performed by: NURSE PRACTITIONER

## 2023-10-03 PROCEDURE — 99214 PR OFFICE/OUTPT VISIT, EST, LEVL IV, 30-39 MIN: ICD-10-PCS | Mod: 25,,, | Performed by: NURSE PRACTITIONER

## 2023-10-03 PROCEDURE — 1160F PR REVIEW ALL MEDS BY PRESCRIBER/CLIN PHARMACIST DOCUMENTED: ICD-10-PCS | Mod: CPTII,,, | Performed by: NURSE PRACTITIONER

## 2023-10-03 PROCEDURE — 3078F PR MOST RECENT DIASTOLIC BLOOD PRESSURE < 80 MM HG: ICD-10-PCS | Mod: CPTII,,, | Performed by: NURSE PRACTITIONER

## 2023-10-03 PROCEDURE — 1159F MED LIST DOCD IN RCRD: CPT | Mod: CPTII,,, | Performed by: NURSE PRACTITIONER

## 2023-10-03 PROCEDURE — 3044F HG A1C LEVEL LT 7.0%: CPT | Mod: CPTII,,, | Performed by: NURSE PRACTITIONER

## 2023-10-03 PROCEDURE — 3074F SYST BP LT 130 MM HG: CPT | Mod: CPTII,,, | Performed by: NURSE PRACTITIONER

## 2023-10-03 PROCEDURE — 3074F PR MOST RECENT SYSTOLIC BLOOD PRESSURE < 130 MM HG: ICD-10-PCS | Mod: CPTII,,, | Performed by: NURSE PRACTITIONER

## 2023-10-03 PROCEDURE — 17110 DESTRUCTION, BENIGN LESION: ICD-10-PCS | Mod: ,,, | Performed by: NURSE PRACTITIONER

## 2023-10-03 PROCEDURE — 3008F PR BODY MASS INDEX (BMI) DOCUMENTED: ICD-10-PCS | Mod: CPTII,,, | Performed by: NURSE PRACTITIONER

## 2023-10-03 PROCEDURE — 17110 DESTRUCTION B9 LES UP TO 14: CPT | Mod: ,,, | Performed by: NURSE PRACTITIONER

## 2023-10-03 PROCEDURE — 1160F RVW MEDS BY RX/DR IN RCRD: CPT | Mod: CPTII,,, | Performed by: NURSE PRACTITIONER

## 2023-10-03 PROCEDURE — 99214 OFFICE O/P EST MOD 30 MIN: CPT | Mod: 25,,, | Performed by: NURSE PRACTITIONER

## 2023-10-03 NOTE — ASSESSMENT & PLAN NOTE
Patient has been off of Bystolic now for almost a year and monitors heart rate daily with her smart watch.  No recorded tachycardia at rest.  Continue with heart rate monitoring per smart watch.

## 2023-10-03 NOTE — PROGRESS NOTES
Subjective:       Patient ID: Mau Hebert is a 23 y.o. female.    Chief Complaint: ADHD (3 month f/u), Anxiety (3 month f/u/Anxiety has increased over the last month.), and Skin Lesion (Lesion to R ear)      HPI   This is a 23-year-old white female who presents to clinic today for six-month follow-up for anxiety, ADHD, skin lesion, sinus tachycardia.  Patient reports that she also has a skin lesion to her right hand.  She would like to get that 1 frozen off as well as the 1 to her right ear.  States doing well with medications and denies any side effects.  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  Review of Systems  Comprehensive review of systems negative except as stated in HPI    The patient's Health Maintenance was reviewed and the following appears to be due:   Health Maintenance Due   Topic Date Due    Chlamydia Screening  Never done    Influenza Vaccine (1) 09/01/2023       Past Medical History:  Past Medical History:   Diagnosis Date    ADHD (attention deficit hyperactivity disorder)     Bilateral arm fractures     History of laparoscopic appendectomy     MRSA infection     SVT (supraventricular tachycardia)     Vasovagal syncope      Past Surgical History:   Procedure Laterality Date    APPENDECTOMY  2016    INCISION AND DRAINAGE OF WOUND       Review of patient's allergies indicates:   Allergen Reactions    Amitriptyline Other (See Comments)     Felt out of it. Eye would not open.     Current Outpatient Medications on File Prior to Visit   Medication Sig Dispense Refill    ALPRAZolam (XANAX) 0.25 MG tablet       JUNEL FE 24 1 mg-20 mcg (24)/75 mg (4) per tablet       lisdexamfetamine (VYVANSE) 30 MG capsule Take 1 capsule (30 mg total) by mouth every morning. 30 capsule 0    vilazodone (VIIBRYD) 20 mg Tab Take 1 tablet (20 mg total) by mouth once daily. 90 tablet 3    [DISCONTINUED] nebivoloL (BYSTOLIC) 10 MG Tab Take 1 tablet (10 mg total) by mouth once daily. 30 tablet 11     No  "current facility-administered medications on file prior to visit.     Social History     Socioeconomic History    Marital status: Single   Tobacco Use    Smoking status: Every Day     Types: Vaping with nicotine    Smokeless tobacco: Never   Substance and Sexual Activity    Alcohol use: Yes     Comment: rarely    Drug use: Yes     Types: Marijuana    Sexual activity: Yes     Partners: Male   Social History Narrative    Lives with parents and 2 younger brothers.  Both parents smoke.     Family History   Problem Relation Age of Onset    Depression Mother         Multiple family memebers on both sides.    Hearing loss Father     Hyperlipidemia Father     Hypertension Father     No Known Problems Brother     Hearing loss Maternal Grandmother     No Known Problems Maternal Grandfather     No Known Problems Paternal Grandmother     Hearing loss Paternal Grandfather     Learning disabilities Brother         Aspergers    Congenital heart disease Neg Hx     Pacemaker/defibrilator Neg Hx     Arrhythmia Neg Hx     Early death Neg Hx     Heart attacks under age 50 Neg Hx     Deafness Neg Hx     Long QT syndrome Neg Hx     Seizures Neg Hx        Objective:       /74 (BP Location: Left arm)   Pulse 76   Temp 98.2 °F (36.8 °C) (Oral)   Resp 16   Ht 5' 6" (1.676 m)   Wt 81.6 kg (180 lb)   LMP 09/05/2023 (Within Days)   SpO2 100%   BMI 29.05 kg/m²      Physical Exam  Vitals and nursing note reviewed.   Constitutional:       Appearance: Normal appearance.   HENT:      Head: Normocephalic and atraumatic.      Right Ear: Tympanic membrane, ear canal and external ear normal.      Left Ear: Tympanic membrane, ear canal and external ear normal.      Nose: Nose normal.      Mouth/Throat:      Mouth: Mucous membranes are moist.      Pharynx: Oropharynx is clear.   Eyes:      Extraocular Movements: Extraocular movements intact.      Conjunctiva/sclera: Conjunctivae normal.      Pupils: Pupils are equal, round, and reactive to " light.   Cardiovascular:      Rate and Rhythm: Normal rate and regular rhythm.      Heart sounds: Normal heart sounds.   Pulmonary:      Effort: Pulmonary effort is normal.      Breath sounds: Normal breath sounds.   Musculoskeletal:         General: Normal range of motion.      Cervical back: Normal range of motion and neck supple.   Skin:     General: Skin is warm and dry.      Comments: Raised flesh-colored lesion noted to right helix of ear. Right middle palm raised wart noted   Neurological:      General: No focal deficit present.      Mental Status: She is alert and oriented to person, place, and time.   Psychiatric:         Mood and Affect: Mood normal.         Behavior: Behavior normal.         Thought Content: Thought content normal.         Judgment: Judgment normal.             Assessment and Plan       ICD-10-CM ICD-9-CM   1. Generalized anxiety disorder  F41.1 300.02   2. Attention deficit hyperactivity disorder (ADHD), unspecified ADHD type  F90.9 314.01   3. Skin lesion of right ear  H61.91 380.9   4. Sinus tachycardia  R00.0 427.89   5. Wart of hand  B07.9 078.10        1. Generalized anxiety disorder  Overview:  Lexapro and Viibryd in past    Current - Xanax 0.25 mg BID prn     03/06/2023 - Viibryd 20 mg daily     Assessment & Plan:  Stable, continue Viibryd daily and Xanax as needed, follow-up 3 months.      2. Attention deficit hyperactivity disorder (ADHD), unspecified ADHD type  Overview:  DX as child  Stable on Vyvanse 30mg daily    Assessment & Plan:  Stable, continue Vyvanse 30 mg daily, follow-up 3 months.      3. Skin lesion of right ear  Overview:  Formed over previous piercing spot, right helix    Assessment & Plan:  Lesion destruction via cryotherapy, see procedure note.    Orders:  -     Destruction, Benign Lesion    4. Sinus tachycardia  Overview:  Metoprolol made her very sleepy  RX Bystolic given in February 2022 - never started  August 2022 - start Bystolic or will need to stop  Vyvanse   December 2022 - stopped Bystolic and heart rate remained within normal ranges    Assessment & Plan:  Patient has been off of Bystolic now for almost a year and monitors heart rate daily with her smart watch.  No recorded tachycardia at rest.  Continue with heart rate monitoring per smart watch.      5. Wart of hand  Assessment & Plan:  Lesion destruction via cryotherapy, see procedure note.    Orders:  -     Destruction, Benign Lesion           Follow up in 3 months (on 1/11/2024) for Virtual Visit.

## 2023-10-04 ENCOUNTER — PATIENT MESSAGE (OUTPATIENT)
Dept: FAMILY MEDICINE | Facility: CLINIC | Age: 23
End: 2023-10-04
Payer: COMMERCIAL

## 2023-11-06 DIAGNOSIS — F90.9 ATTENTION DEFICIT HYPERACTIVITY DISORDER (ADHD), UNSPECIFIED ADHD TYPE: ICD-10-CM

## 2023-11-06 RX ORDER — LISDEXAMFETAMINE DIMESYLATE 30 MG/1
30 CAPSULE ORAL EVERY MORNING
Qty: 30 CAPSULE | Refills: 0 | Status: SHIPPED | OUTPATIENT
Start: 2023-11-06 | End: 2023-12-12 | Stop reason: SDUPTHER

## 2023-12-12 ENCOUNTER — PATIENT MESSAGE (OUTPATIENT)
Dept: FAMILY MEDICINE | Facility: CLINIC | Age: 23
End: 2023-12-12
Payer: COMMERCIAL

## 2023-12-12 DIAGNOSIS — F90.9 ATTENTION DEFICIT HYPERACTIVITY DISORDER (ADHD), UNSPECIFIED ADHD TYPE: ICD-10-CM

## 2023-12-12 RX ORDER — LISDEXAMFETAMINE DIMESYLATE 30 MG/1
30 CAPSULE ORAL EVERY MORNING
Qty: 30 CAPSULE | Refills: 0 | Status: SHIPPED | OUTPATIENT
Start: 2023-12-12 | End: 2024-01-10

## 2024-01-02 ENCOUNTER — TELEPHONE (OUTPATIENT)
Dept: FAMILY MEDICINE | Facility: CLINIC | Age: 24
End: 2024-01-02
Payer: COMMERCIAL

## 2024-01-02 NOTE — TELEPHONE ENCOUNTER
Spoke to patient about her symptoms.  She is feeling better and declined appointment for 1/2/24 at 2:20.  She stated that she started with symptoms on 12/29/23 and her breathing has improved.

## 2024-01-03 ENCOUNTER — PATIENT MESSAGE (OUTPATIENT)
Dept: FAMILY MEDICINE | Facility: CLINIC | Age: 24
End: 2024-01-03
Payer: COMMERCIAL

## 2024-01-03 NOTE — LETTER
January 3, 2024      [facility]  [address] [place] [address]  Phone: [phone]       Patient: Mau Hebert   YOB: 2000  Date of Visit: 01/03/2024    To Whom It May Concern:    Please excuse Mau Anup  on 1/2/2024 and 1/3/2024. The patient may return to work/school on 1/4/2024. If you have any questions or concerns, or if I can be of further assistance, please do not hesitate to contact me.    Sincerely,    Sydnie Anaya MA

## 2024-01-10 ENCOUNTER — OFFICE VISIT (OUTPATIENT)
Dept: FAMILY MEDICINE | Facility: CLINIC | Age: 24
End: 2024-01-10
Payer: COMMERCIAL

## 2024-01-10 VITALS
SYSTOLIC BLOOD PRESSURE: 124 MMHG | HEART RATE: 94 BPM | BODY MASS INDEX: 29.05 KG/M2 | DIASTOLIC BLOOD PRESSURE: 88 MMHG | HEIGHT: 66 IN

## 2024-01-10 DIAGNOSIS — F17.290 OTHER TOBACCO PRODUCT NICOTINE DEPENDENCE, UNCOMPLICATED: ICD-10-CM

## 2024-01-10 DIAGNOSIS — Z00.00 ANNUAL PHYSICAL EXAM: ICD-10-CM

## 2024-01-10 DIAGNOSIS — F41.1 GENERALIZED ANXIETY DISORDER: Primary | ICD-10-CM

## 2024-01-10 DIAGNOSIS — F90.9 ATTENTION DEFICIT HYPERACTIVITY DISORDER (ADHD), UNSPECIFIED ADHD TYPE: ICD-10-CM

## 2024-01-10 PROCEDURE — 1160F RVW MEDS BY RX/DR IN RCRD: CPT | Mod: CPTII,95,, | Performed by: NURSE PRACTITIONER

## 2024-01-10 PROCEDURE — 99213 OFFICE O/P EST LOW 20 MIN: CPT | Mod: 95,,, | Performed by: NURSE PRACTITIONER

## 2024-01-10 PROCEDURE — 3008F BODY MASS INDEX DOCD: CPT | Mod: CPTII,95,, | Performed by: NURSE PRACTITIONER

## 2024-01-10 PROCEDURE — 3074F SYST BP LT 130 MM HG: CPT | Mod: CPTII,95,, | Performed by: NURSE PRACTITIONER

## 2024-01-10 PROCEDURE — 1159F MED LIST DOCD IN RCRD: CPT | Mod: CPTII,95,, | Performed by: NURSE PRACTITIONER

## 2024-01-10 PROCEDURE — 3079F DIAST BP 80-89 MM HG: CPT | Mod: CPTII,95,, | Performed by: NURSE PRACTITIONER

## 2024-01-10 RX ORDER — LISDEXAMFETAMINE DIMESYLATE CAPSULES 40 MG/1
40 CAPSULE ORAL DAILY
Qty: 30 CAPSULE | Refills: 0 | Status: SHIPPED | OUTPATIENT
Start: 2024-01-10

## 2024-01-10 RX ORDER — ALPRAZOLAM 0.25 MG/1
0.25 TABLET ORAL DAILY PRN
Qty: 30 TABLET | Refills: 0 | Status: SHIPPED | OUTPATIENT
Start: 2024-01-10

## 2024-01-10 NOTE — PROGRESS NOTES
TELEMEDICINE VISIT     Patient ID: Mau Hebert is a 23 y.o. female.  MRN: [id number]  : 2000    Subjective:        TELEMEDICINE  The patient location is: home  The chief complaint leading to consultation is: ADHD (3 month f/u) and Anxiety (3 month f/u. Anxiety was very high for 3-4 weeks, but has leveled off again.)     Visit type: Virtual visit with synchronous audio and video    Total time spent with patient: 15 minutes  20 minutes of total time spent on the encounter, which includes face to face time and non-face to face time preparing to see the patient (eg, review of tests), obtaining and/or reviewing separately obtained history, documenting clinical information in the electronic or other health record, independently interpreting results (not separately reported) and communicating results to the patient/family/caregiver, or care coordination (not separately reported).    Each patient to whom he or she provides medical services by telemedicine is:  (1) informed of the relationship between the physician and patient and the respective role of any other health care provider with respect to management of the patient; and (2) notified that he or she may decline to receive medical services by telemedicine and may withdraw from such care at any time.    HPI: HPI   This is a 23-year-old white female who was seen today via virtual visit for three-month follow-up for ADHD, anxiety, nicotine dependence.  States she is now on her 4th day without vaping.  Feels great.  Does not feel like her Vyvanse is working as well as it used to.    Health maintenance reviewed with the patient.  Health maintenance completed:  Health Maintenance Topics with due status: Not Due       Topic Last Completion Date    TETANUS VACCINE 2021    Pap Smear 2022      Health maintenance due:  There are no preventive care reminders to display for this patient.   ROS:  Review of Systems   Constitutional:  Negative for activity  change and unexpected weight change.   HENT:  Negative for hearing loss, rhinorrhea and trouble swallowing.    Eyes:  Negative for discharge and visual disturbance.   Respiratory:  Negative for chest tightness and wheezing.    Cardiovascular:  Negative for chest pain and palpitations.   Gastrointestinal:  Negative for blood in stool, constipation, diarrhea and vomiting.   Endocrine: Negative for polydipsia and polyuria.   Genitourinary:  Negative for difficulty urinating, dysuria, hematuria and menstrual problem.   Musculoskeletal:  Negative for arthralgias, joint swelling and neck pain.   Neurological:  Negative for weakness and headaches.   Psychiatric/Behavioral:  Negative for confusion and dysphoric mood.       Complete ROS negative except as stated in HPI  History:     Past Medical History:   Diagnosis Date    ADHD (attention deficit hyperactivity disorder)     Bilateral arm fractures     History of laparoscopic appendectomy     History of mononucleosis     MRSA infection     SVT (supraventricular tachycardia)     Vasovagal syncope       Past Surgical History:   Procedure Laterality Date    APPENDECTOMY  2016    INCISION AND DRAINAGE OF WOUND       Family History   Problem Relation Age of Onset    Depression Mother         Multiple family memebers on both sides.    Hearing loss Father     Hyperlipidemia Father     Hypertension Father     No Known Problems Brother     Hearing loss Maternal Grandmother     No Known Problems Maternal Grandfather     No Known Problems Paternal Grandmother     Hearing loss Paternal Grandfather     Learning disabilities Brother         Aspergers    Congenital heart disease Neg Hx     Pacemaker/defibrilator Neg Hx     Arrhythmia Neg Hx     Early death Neg Hx     Heart attacks under age 50 Neg Hx     Deafness Neg Hx     Long QT syndrome Neg Hx     Seizures Neg Hx       Social History     Tobacco Use    Smoking status: Former     Types: Vaping with nicotine    Smokeless tobacco: Never  "  Substance and Sexual Activity    Alcohol use: Yes     Comment: rarely    Drug use: Yes     Types: Marijuana    Sexual activity: Yes     Partners: Male          Allergies:   Review of patient's allergies indicates:   Allergen Reactions    Amitriptyline Other (See Comments)     Felt out of it. Eye would not open.     Objective:     Vitals:    01/10/24 1527   BP: 124/88   Pulse: 94   Height: 5' 6" (1.676 m)   PainSc: 0-No pain         Physical Examination:   Physical Exam  Vitals and nursing note reviewed.   Constitutional:       Appearance: Normal appearance.   HENT:      Head: Normocephalic and atraumatic.   Neurological:      General: No focal deficit present.      Mental Status: She is alert and oriented to person, place, and time.   Psychiatric:         Mood and Affect: Mood normal.         Behavior: Behavior normal.         Thought Content: Thought content normal.         Judgment: Judgment normal.           Medications:     Medication List with Changes/Refills   New Medications    LISDEXAMFETAMINE (VYVANSE) 40 MG CAP    Take 1 capsule (40 mg total) by mouth once daily.   Current Medications    JUNEL FE 24 1 MG-20 MCG (24)/75 MG (4) PER TABLET        VILAZODONE (VIIBRYD) 20 MG TAB    Take 1 tablet (20 mg total) by mouth once daily.   Changed and/or Refilled Medications    Modified Medication Previous Medication    ALPRAZOLAM (XANAX) 0.25 MG TABLET ALPRAZolam (XANAX) 0.25 MG tablet       Take 1 tablet (0.25 mg total) by mouth daily as needed for Anxiety.       Discontinued Medications    LISDEXAMFETAMINE (VYVANSE) 30 MG CAPSULE    Take 1 capsule (30 mg total) by mouth every morning.     Assessment and Plan       ICD-10-CM ICD-9-CM   1. Generalized anxiety disorder  F41.1 300.02   2. Attention deficit hyperactivity disorder (ADHD), unspecified ADHD type  F90.9 314.01   3. Other tobacco product nicotine dependence, uncomplicated  F17.290 305.1   4. Annual physical exam  Z00.00 V70.0     1. Generalized anxiety " disorder  Overview:  Lexapro and Viibryd in past    Current - Xanax 0.25 mg BID prn     03/06/2023 - Viibryd 20 mg daily     Assessment & Plan:  Stable, continue Viibryd daily and Xanax as needed.  Follow-up 3 months.    Orders:  -     ALPRAZolam (XANAX) 0.25 MG tablet; Take 1 tablet (0.25 mg total) by mouth daily as needed for Anxiety.  Dispense: 30 tablet; Refill: 0    2. Attention deficit hyperactivity disorder (ADHD), unspecified ADHD type  Overview:  DX as child  Previously Vyvanse 30mg daily    01/10/2024 - increase Vyvanse to 40 mg daily    Assessment & Plan:  Increase Vyvanse to 40 mg daily, follow-up 3 months with wellness    Orders:  -     lisdexamfetamine (VYVANSE) 40 MG Cap; Take 1 capsule (40 mg total) by mouth once daily.  Dispense: 30 capsule; Refill: 0    3. Other tobacco product nicotine dependence, uncomplicated  Assessment & Plan:  Is now on day 4 with no nicotine, continue with abstinence from nicotine.      4. Annual physical exam  Overview:  Annual exam yearly in March    Orders:  -     CBC Auto Differential; Future; Expected date: 04/10/2024  -     Comprehensive Metabolic Panel; Future; Expected date: 04/10/2024  -     Lipid Panel; Future; Expected date: 04/10/2024  -     TSH; Future; Expected date: 04/10/2024              Follow Up:   Follow up in 3 months (on 4/1/2024) for Annual.    I spent greater than 20 minutes today both in chart review and greater than 50% of that time in discussion with the patient regarding health maintenance, diagnoses, diagnostic tests, medications, treatments, symptom management, expected results and adverse effects. Patient verbalized understanding and all questions were answered.

## 2024-01-11 ENCOUNTER — PATIENT MESSAGE (OUTPATIENT)
Dept: FAMILY MEDICINE | Facility: CLINIC | Age: 24
End: 2024-01-11

## 2024-01-11 DIAGNOSIS — F90.9 ATTENTION DEFICIT HYPERACTIVITY DISORDER (ADHD), UNSPECIFIED ADHD TYPE: ICD-10-CM

## 2024-01-11 RX ORDER — SERDEXMETHYLPHENIDATE AND DEXMETHYLPHENIDATE 7.8; 39.2 MG/1; MG/1
1 CAPSULE ORAL DAILY
Qty: 30 CAPSULE | Refills: 0 | Status: SHIPPED | OUTPATIENT
Start: 2024-01-11

## 2024-02-11 LAB
PAP RECOMMENDATION EXT: NORMAL
PAP SMEAR: NORMAL

## 2024-03-06 ENCOUNTER — E-VISIT (OUTPATIENT)
Dept: FAMILY MEDICINE | Facility: CLINIC | Age: 24
End: 2024-03-06
Payer: COMMERCIAL

## 2024-03-06 ENCOUNTER — PATIENT MESSAGE (OUTPATIENT)
Dept: FAMILY MEDICINE | Facility: CLINIC | Age: 24
End: 2024-03-06
Payer: COMMERCIAL

## 2024-03-06 DIAGNOSIS — J02.9 PHARYNGITIS, UNSPECIFIED ETIOLOGY: Primary | ICD-10-CM

## 2024-03-06 DIAGNOSIS — R05.9 COUGH, UNSPECIFIED TYPE: ICD-10-CM

## 2024-03-06 PROCEDURE — 99421 OL DIG E/M SVC 5-10 MIN: CPT | Mod: ,,, | Performed by: NURSE PRACTITIONER

## 2024-03-06 RX ORDER — PREDNISONE 20 MG/1
20 TABLET ORAL DAILY
Qty: 5 TABLET | Refills: 0 | Status: SHIPPED | OUTPATIENT
Start: 2024-03-06 | End: 2024-04-01

## 2024-03-06 RX ORDER — CEFDINIR 300 MG/1
300 CAPSULE ORAL 2 TIMES DAILY
Qty: 20 CAPSULE | Refills: 0 | Status: SHIPPED | OUTPATIENT
Start: 2024-03-06 | End: 2024-04-01

## 2024-03-06 RX ORDER — PROMETHAZINE HYDROCHLORIDE AND DEXTROMETHORPHAN HYDROBROMIDE 6.25; 15 MG/5ML; MG/5ML
5 SYRUP ORAL EVERY 4 HOURS PRN
Qty: 200 ML | Refills: 0 | Status: SHIPPED | OUTPATIENT
Start: 2024-03-06 | End: 2024-04-01

## 2024-03-06 NOTE — PROGRESS NOTES
Patient ID: Mau Hebert is a 23 y.o. female.    Chief Complaint: URI (Entered automatically based on patient selection in Patient Portal.)    The patient initiated a request through [employer] on 3/6/2024 for evaluation and management with a chief complaint of URI (Entered automatically based on patient selection in Patient Portal.)     I evaluated the questionnaire submission on 03/06/2024.    Ohs Peq Evisit Upper Respitatory/Cough Questionnaire    3/6/2024 11:29 AM CST - Filed by Patient   Do you agree to participate in an E-Visit? Yes   If you have any of the following symptoms, please present to your local ER or call 911:  I acknowledge   What is the main issue that you would like for your doctor to address today? Sore throat (red, scratchy, inflammation ), loss of voice, cough   Are you able to take your vital signs? No   Are you currently pregnant, could you be pregnant, or are you breast feeding? None of the above   What symptoms do you currently have?  Cough;  Fatigue;  Nasal Congestion;  Runny nose;  Sore throat;  Ear pain   Describe your cough: Productive (containing mucus);  Dry   Describe the mucus: Yellow;  Thick   Have you had any of the following? Difficulty swallowing   Please enter a few details about your swallowing, breathing, or visual problems. Hurts to swallow due to inflimation   Have you ever smoked? I have smoked in the past   Have you had a fever? No   When did your symptoms first appear? 3/1/2024   In the last two weeks, have you been in close contact with someone who has COVID-19 or the Flu? No   In the last two weeks, have you worked or volunteered in a healthcare facility or as a ? Healthcare facilities include a hospital, medical or dental clinic, long-term care facility, or nursing home Yes   Do you live in a long-term care facility, nursing home, group home, or homeless shelter? No   List what you have done or taken to help your symptoms. Cough drops, claritin,  sudafed   How severe are your symptoms? Moderate   Have your symptoms improved since they first appeared? Worse   Have you taken an at home Covid test? No   Have you taken a Flu test? No   Have you been fully vaccinated for COVID? (2 Pfizer, 2 Moderna or 1 Gregory & Gregory vaccine injections) Yes   Have you received a booster? No   Have you recieved a Flu shot? Yes   When did you recieve your Flu shot? 11/23/2023   Do you have transportation to get tested for COVID if it is indicated and ordered for you at an [facility] location? No   Provide any information you feel is important to your history not asked above Last week i had migraines daily for 3 days ending on friday. Sinus pressure in ears and head. Feels like im straining to speak.   Please attach any relevant images or files          Encounter Diagnoses   Name Primary?    Pharyngitis, unspecified etiology Yes    Cough, unspecified type         No orders of the defined types were placed in this encounter.     Medications Ordered This Encounter   Medications    cefdinir (OMNICEF) 300 MG capsule     Sig: Take 1 capsule (300 mg total) by mouth 2 (two) times daily. for 10 days     Dispense:  20 capsule     Refill:  0    predniSONE (DELTASONE) 20 MG tablet     Sig: Take 1 tablet (20 mg total) by mouth once daily.     Dispense:  5 tablet     Refill:  0    promethazine-dextromethorphan (PROMETHAZINE-DM) 6.25-15 mg/5 mL Syrp     Sig: Take 5 mLs by mouth every 4 (four) hours as needed.     Dispense:  200 mL     Refill:  0        Follow up if symptoms worsen or fail to improve.      E-Visit Time Tracking:    Day 1 Time (in minutes): 7    Total Time (in minutes): 7

## 2024-03-06 NOTE — LETTER
March 6, 2024      [facility]  [address] [place] [address]  Phone: [phone]       Patient: Mau Hebert   YOB: 2000  Date of Visit: 03/07/2024    To Whom It May Concern:    Bigg Hebert  was at [facility] on 03/06/2024. The patient may return to work/school on 03/07/2024 with no restrictions. If you have any questions or concerns, or if I can be of further assistance, please do not hesitate to contact me.    Sincerely,    Sydnie Anaya MA

## 2024-03-07 ENCOUNTER — PATIENT MESSAGE (OUTPATIENT)
Dept: FAMILY MEDICINE | Facility: CLINIC | Age: 24
End: 2024-03-07
Payer: COMMERCIAL

## 2024-03-25 ENCOUNTER — TELEPHONE (OUTPATIENT)
Dept: FAMILY MEDICINE | Facility: CLINIC | Age: 24
End: 2024-03-25
Payer: COMMERCIAL

## 2024-03-25 LAB
% NEUTROPHILS (OHS): 51
ALBUMIN SERPL BCP-MCNC: 4.4 G/DL
ALBUMIN/GLOB SERPL ELPH: 1.8 {RATIO}
ALP SERPL-CCNC: 78 U/L (ref 25–125)
ALT SERPL-CCNC: 30 U/L
AST SERPL-CCNC: 27 U/L
BASOPHILS NFR BLD: 0.1 K/UL
BASOPHILS NFR BLD: 2 % (ref 0–3)
BILIRUB SERPL-MCNC: 0.3 MG/DL
BUN/CREAT SERPL: 14
CALCIUM SERPL-MCNC: 9.6 MG/DL
CHLORIDE: 98 MMOL/L
CHOLESTEROL, TOTAL: 248
CO2 SERPL-SCNC: 21 MMOL/L
CREATINE, SERUM: 0.8
EGFR: 106
EOS%, CSF: 11 %
EOSINOPHIL NFR BLD: 0.6 K/UL
ERYTHROCYTE [DISTWIDTH] IN BLOOD BY AUTOMATED COUNT: 13.2 %
GLOBULIN SER-MCNC: 2.5 GM/DL
GLUCOSE: 73 MG/DL
HCT VFR BLD AUTO: 40.7 %
HDLC SERPL-MCNC: 69 MG/DL
HGB BLD-MCNC: 13.1 G/DL
IMMATURE GRANULOCYTES: 0
LDLC SERPL CALC-MCNC: 164 MG/DL
LYMPH #: 1.5 K/UL
LYMPH%: 27 % (ref 18–52)
MCH RBC QN AUTO: 27.9 PG
MCHC RBC AUTO-ENTMCNC: 32.2 G/DL
MCV RBC AUTO: 87 FL
MONOCYTES %: 9
MONOCYTES NFR BLD MANUAL: 1 %
NEUTROPHILS NFR BLD: 2.7 %
PLATELET # BLD AUTO: 320 K/UL (ref 150–399)
POTASSIUM: 4.7 MMOL/L
PROT SERPL-MCNC: 6.9 G/DL
RBC # BLD AUTO: 4.7 M/UL
SODIUM: 135 MMOL/L
TRIGL SERPL-MCNC: 87 MG/DL
TSH SERPL DL<=0.005 MIU/L-ACNC: 0.84 UIU/ML (ref 0.41–5.9)
UREA NITROGEN (BUN): 11 MG/DL
VLDL CHOLESTEROL: 15 MG/DL
WBC # BLD AUTO: 5.5 K/UL

## 2024-03-25 NOTE — TELEPHONE ENCOUNTER
Are there any outstanding tasks in patient's chart?    labs  2. Do we have outstanding/pending referrals?  n    3. Has the patient been seen in an ER, Urgent Care, or admitted since last visit?  n    4. Has patient seen any other health care providers since last visit?  Dr. Zaidi- Pap Smear    5.  Has patient had any blood work or x-rays done since last visit?   Patient will complete orders and forward results

## 2024-03-26 DIAGNOSIS — F90.9 ATTENTION DEFICIT HYPERACTIVITY DISORDER (ADHD), UNSPECIFIED ADHD TYPE: ICD-10-CM

## 2024-03-26 RX ORDER — SERDEXMETHYLPHENIDATE AND DEXMETHYLPHENIDATE 7.8; 39.2 MG/1; MG/1
1 CAPSULE ORAL
Qty: 30 CAPSULE | Refills: 0 | Status: SHIPPED | OUTPATIENT
Start: 2024-03-26 | End: 2024-04-25 | Stop reason: SDUPTHER

## 2024-04-01 ENCOUNTER — OFFICE VISIT (OUTPATIENT)
Dept: FAMILY MEDICINE | Facility: CLINIC | Age: 24
End: 2024-04-01
Payer: COMMERCIAL

## 2024-04-01 ENCOUNTER — DOCUMENTATION ONLY (OUTPATIENT)
Dept: FAMILY MEDICINE | Facility: CLINIC | Age: 24
End: 2024-04-01

## 2024-04-01 VITALS
DIASTOLIC BLOOD PRESSURE: 74 MMHG | OXYGEN SATURATION: 100 % | HEART RATE: 79 BPM | SYSTOLIC BLOOD PRESSURE: 122 MMHG | TEMPERATURE: 98 F | BODY MASS INDEX: 30.05 KG/M2 | RESPIRATION RATE: 16 BRPM | HEIGHT: 66 IN | WEIGHT: 187 LBS

## 2024-04-01 DIAGNOSIS — Z00.00 ANNUAL PHYSICAL EXAM: Primary | ICD-10-CM

## 2024-04-01 DIAGNOSIS — E78.2 MODERATE MIXED HYPERLIPIDEMIA NOT REQUIRING STATIN THERAPY: ICD-10-CM

## 2024-04-01 DIAGNOSIS — F41.1 GENERALIZED ANXIETY DISORDER: ICD-10-CM

## 2024-04-01 DIAGNOSIS — F90.9 ATTENTION DEFICIT HYPERACTIVITY DISORDER (ADHD), UNSPECIFIED ADHD TYPE: ICD-10-CM

## 2024-04-01 PROCEDURE — 3078F DIAST BP <80 MM HG: CPT | Mod: CPTII,,, | Performed by: NURSE PRACTITIONER

## 2024-04-01 PROCEDURE — 3074F SYST BP LT 130 MM HG: CPT | Mod: CPTII,,, | Performed by: NURSE PRACTITIONER

## 2024-04-01 PROCEDURE — 1159F MED LIST DOCD IN RCRD: CPT | Mod: CPTII,,, | Performed by: NURSE PRACTITIONER

## 2024-04-01 PROCEDURE — 99395 PREV VISIT EST AGE 18-39: CPT | Mod: ,,, | Performed by: NURSE PRACTITIONER

## 2024-04-01 PROCEDURE — 3008F BODY MASS INDEX DOCD: CPT | Mod: CPTII,,, | Performed by: NURSE PRACTITIONER

## 2024-04-01 PROCEDURE — 1160F RVW MEDS BY RX/DR IN RCRD: CPT | Mod: CPTII,,, | Performed by: NURSE PRACTITIONER

## 2024-04-01 RX ORDER — ALPRAZOLAM 0.25 MG/1
0.25 TABLET ORAL 2 TIMES DAILY PRN
Qty: 60 TABLET | Refills: 0 | Status: SHIPPED | OUTPATIENT
Start: 2024-04-01

## 2024-04-01 NOTE — ASSESSMENT & PLAN NOTE
Stable, total cholesterol 248, .  Patient did eat a large steak the night before her blood draw.  Discussed low-cholesterol diet, lifestyle modification including increased exercise, recheck 1 year.

## 2024-04-01 NOTE — PROGRESS NOTES
Subjective:       Patient ID: Mau Hebert is a 23 y.o. female.    Chief Complaint: Annual Exam      HPI   This is a 23-year-old white female who presents to clinic today for an annual wellness exam.  Patient reports overall she is doing well.  Reports that she was having some increased anxiety, has been very stressed out at work.  Matthews like her birth control was contributing, she stopped it one-month ago and feels like her anxiety has gotten better since being off the birth control.    Review of Systems  Comprehensive review of systems negative except as stated in HPI    The patient's Health Maintenance was reviewed and the following appears to be due:   There are no preventive care reminders to display for this patient.    Past Medical History:  Past Medical History:   Diagnosis Date    ADHD (attention deficit hyperactivity disorder)     Bilateral arm fractures     History of laparoscopic appendectomy     History of mononucleosis     MRSA infection     SVT (supraventricular tachycardia)     Vasovagal syncope      Past Surgical History:   Procedure Laterality Date    APPENDECTOMY  2016    INCISION AND DRAINAGE OF WOUND       Review of patient's allergies indicates:   Allergen Reactions    Amitriptyline Other (See Comments)     Felt out of it. Eye would not open.     Current Outpatient Medications on File Prior to Visit   Medication Sig Dispense Refill    AZSTARYS 39.2 mg- 7.8 mg Cap TAKE ONE CAPSULE BY MOUTH ONCE DAILY 30 capsule 0    vilazodone (VIIBRYD) 20 mg Tab Take 1 tablet (20 mg total) by mouth once daily. 90 tablet 3    [DISCONTINUED] ALPRAZolam (XANAX) 0.25 MG tablet Take 1 tablet (0.25 mg total) by mouth daily as needed for Anxiety. 30 tablet 0    [DISCONTINUED] JUNEL FE 24 1 mg-20 mcg (24)/75 mg (4) per tablet       [DISCONTINUED] lisdexamfetamine (VYVANSE) 40 MG Cap Take 1 capsule (40 mg total) by mouth once daily. 30 capsule 0    [DISCONTINUED] predniSONE (DELTASONE) 20 MG tablet Take 1 tablet  "(20 mg total) by mouth once daily. 5 tablet 0    [DISCONTINUED] cefdinir (OMNICEF) 300 MG capsule Take 1 capsule (300 mg total) by mouth 2 (two) times daily. for 10 days 20 capsule 0    [DISCONTINUED] promethazine-dextromethorphan (PROMETHAZINE-DM) 6.25-15 mg/5 mL Syrp Take 5 mLs by mouth every 4 (four) hours as needed. 200 mL 0     No current facility-administered medications on file prior to visit.     Social History     Socioeconomic History    Marital status: Single   Tobacco Use    Smoking status: Former     Types: Vaping with nicotine    Smokeless tobacco: Never   Substance and Sexual Activity    Alcohol use: Yes     Comment: rarely    Drug use: Yes     Types: Marijuana    Sexual activity: Yes     Partners: Male   Social History Narrative    Lives with parents and 2 younger brothers.  Both parents smoke.     Family History   Problem Relation Age of Onset    Depression Mother         Multiple family memebers on both sides.    Hearing loss Father     Hyperlipidemia Father     Hypertension Father     No Known Problems Brother     Hearing loss Maternal Grandmother     No Known Problems Maternal Grandfather     No Known Problems Paternal Grandmother     Hearing loss Paternal Grandfather     Learning disabilities Brother         Aspergers    Congenital heart disease Neg Hx     Pacemaker/defibrilator Neg Hx     Arrhythmia Neg Hx     Early death Neg Hx     Heart attacks under age 50 Neg Hx     Deafness Neg Hx     Long QT syndrome Neg Hx     Seizures Neg Hx        Objective:       /74 (BP Location: Right arm)   Pulse 79   Temp 97.9 °F (36.6 °C) (Oral)   Resp 16   Ht 5' 6" (1.676 m)   Wt 84.8 kg (187 lb)   SpO2 100%   BMI 30.18 kg/m²      Physical Exam  Vitals and nursing note reviewed.   Constitutional:       Appearance: Normal appearance. She is obese.   HENT:      Head: Normocephalic and atraumatic.      Right Ear: Tympanic membrane, ear canal and external ear normal.      Left Ear: Tympanic membrane, " ear canal and external ear normal.      Nose: Nose normal.      Mouth/Throat:      Mouth: Mucous membranes are moist.      Pharynx: Oropharynx is clear.   Eyes:      Extraocular Movements: Extraocular movements intact.      Conjunctiva/sclera: Conjunctivae normal.      Pupils: Pupils are equal, round, and reactive to light.   Cardiovascular:      Rate and Rhythm: Normal rate and regular rhythm.      Heart sounds: Normal heart sounds.   Pulmonary:      Effort: Pulmonary effort is normal.      Breath sounds: Normal breath sounds.   Abdominal:      General: Abdomen is flat. Bowel sounds are normal.      Palpations: Abdomen is soft.   Musculoskeletal:         General: Normal range of motion.      Cervical back: Normal range of motion and neck supple.   Skin:     General: Skin is warm and dry.   Neurological:      General: No focal deficit present.      Mental Status: She is alert and oriented to person, place, and time.   Psychiatric:         Mood and Affect: Mood normal.         Behavior: Behavior normal.         Thought Content: Thought content normal.         Judgment: Judgment normal.         Labs  Documentation Only on 04/01/2024   Component Date Value Ref Range Status    TSH 03/25/2024 0.84  0.41 - 5.90 uIU/mL Final    Cholesterol, Total 03/25/2024 248   Final    Triglycerides 03/25/2024 87  mg/dL Final    HDL 03/25/2024 69  mg/dL Final    VLDL 03/25/2024 15  MG/DL Final    LDL Cholesterol 03/25/2024 164.00  mg/dL Final    Glucose 03/25/2024 73  mg/dL Final    BUN 03/25/2024 11  mg/dL Final    Creatine, Serum 03/25/2024 0.80   Final    eGFR 03/25/2024 106   Final    BUN/Creatinine Ratio 03/25/2024 14   Final    Sodium 03/25/2024 135  mmol/L Final    Potassium 03/25/2024 4.7  mmol/L Final    Chloride 03/25/2024 98  mmol/L Final    Carbon Dioxide 03/25/2024 21  mmol/L Final    Calcium 03/25/2024 9.6  mg/dL Final    Total Protein 03/25/2024 6.9  g/dL Final    Albumin 03/25/2024 4.4  g/dL Final    Globulin 03/25/2024  2.5  gm/dL Final    Albumin/Globulin Ratio 03/25/2024 1.8   Final    Bilirubin Total 03/25/2024 0.3  mg/dL Final    Alkaline Phosphatase 03/25/2024 78  25 - 125 U/L Final    AST 03/25/2024 27  U/L Final    ALT 03/25/2024 30  U/L Final    WBC 03/25/2024 5.5  K/uL Final    RBC 03/25/2024 4.70  M/uL Final    Hemoglobin 03/25/2024 13.1  g/dL Final    Hematocrit 03/25/2024 40.7  % Final    MCV 03/25/2024 87.0  fL Final    MCH 03/25/2024 27.9  pg Final    MCHC 03/25/2024 32.2  g/dL Final    RDW 03/25/2024 13.2  % Final    Platelets 03/25/2024 320  150 - 399 K/uL Final    % NEUTROPHILS 03/25/2024 51   Final    Lymph % 03/25/2024 27  18 - 52 % Final    Monocytes % 03/25/2024 9   Final    Eosinophils, CSF 03/25/2024 11  % Final    Basophil % 03/25/2024 2  0 - 3 % Final    Neutrophils 03/25/2024 2.7   Final    Lymph # 03/25/2024 1.5  K/uL Final    Monocytes 03/25/2024 1   Final    Eos # 03/25/2024 0.6  K/uL Final    Baso # 03/25/2024 0.1  K/uL Final    Immature Granulocytes 03/25/2024 0   Final       Assessment and Plan       ICD-10-CM ICD-9-CM   1. Annual physical exam  Z00.00 V70.0   2. Moderate mixed hyperlipidemia not requiring statin therapy  E78.2 272.2   3. Attention deficit hyperactivity disorder (ADHD), unspecified ADHD type  F90.9 314.01   4. Generalized anxiety disorder  F41.1 300.02        1. Annual physical exam  Overview:  Annual exam yearly in March    Assessment & Plan:  Labs reviewed in detail with patient, repeat 1 year.      2. Moderate mixed hyperlipidemia not requiring statin therapy  Overview:  Diet controlled    Assessment & Plan:  Stable, total cholesterol 248, .  Patient did eat a large steak the night before her blood draw.  Discussed low-cholesterol diet, lifestyle modification including increased exercise, recheck 1 year.      3. Attention deficit hyperactivity disorder (ADHD), unspecified ADHD type  Overview:  DX as child  Previously Vyvanse 30mg daily    01/10/2024 - increase Vyvanse to 40  mg daily    01/11/2024 - Vyvanse over $300, new RX sent for Azstarys 39.2/7.8 mg daily    Assessment & Plan:  Stable, continue Azstarys, follow-up 3 months with virtual visit.      4. Generalized anxiety disorder  Overview:  Lexapro and Viibryd in past    Current - Xanax 0.25 mg BID prn     03/06/2023 - Viibryd 20 mg daily     Assessment & Plan:  Stable, continue Viibryd daily and Xanax 0.25 mg twice daily.  Does notice her anxiety has gotten better since stopping the birth control.  Discussed other forms of birth control such as condoms and tracking her cycles.  Follow-up 3 months.    Orders:  -     ALPRAZolam (XANAX) 0.25 MG tablet; Take 1 tablet (0.25 mg total) by mouth 2 (two) times daily as needed for Anxiety.  Dispense: 60 tablet; Refill: 0           Follow up in about 3 months (around 7/1/2024) for Virtual Visit.

## 2024-04-01 NOTE — ASSESSMENT & PLAN NOTE
Stable, continue Viibryd daily and Xanax 0.25 mg twice daily.  Does notice her anxiety has gotten better since stopping the birth control.  Discussed other forms of birth control such as condoms and tracking her cycles.  Follow-up 3 months.

## 2024-04-15 PROBLEM — Z00.00 ANNUAL PHYSICAL EXAM: Status: RESOLVED | Noted: 2023-03-28 | Resolved: 2024-04-15

## 2024-04-25 DIAGNOSIS — F90.9 ATTENTION DEFICIT HYPERACTIVITY DISORDER (ADHD), UNSPECIFIED ADHD TYPE: ICD-10-CM

## 2024-04-25 RX ORDER — SERDEXMETHYLPHENIDATE AND DEXMETHYLPHENIDATE 7.8; 39.2 MG/1; MG/1
1 CAPSULE ORAL DAILY
Qty: 30 CAPSULE | Refills: 0 | Status: SHIPPED | OUTPATIENT
Start: 2024-04-25

## 2024-05-03 ENCOUNTER — PATIENT MESSAGE (OUTPATIENT)
Dept: FAMILY MEDICINE | Facility: CLINIC | Age: 24
End: 2024-05-03
Payer: COMMERCIAL

## 2024-05-06 ENCOUNTER — E-VISIT (OUTPATIENT)
Dept: FAMILY MEDICINE | Facility: CLINIC | Age: 24
End: 2024-05-06
Payer: COMMERCIAL

## 2024-05-06 DIAGNOSIS — J03.91 RECURRENT TONSILLITIS: Primary | ICD-10-CM

## 2024-05-06 PROCEDURE — 99421 OL DIG E/M SVC 5-10 MIN: CPT | Mod: ,,, | Performed by: NURSE PRACTITIONER

## 2024-05-06 RX ORDER — PREDNISONE 20 MG/1
TABLET ORAL
Qty: 8 TABLET | Refills: 0 | Status: SHIPPED | OUTPATIENT
Start: 2024-05-06

## 2024-05-06 RX ORDER — AMOXICILLIN AND CLAVULANATE POTASSIUM 875; 125 MG/1; MG/1
1 TABLET, FILM COATED ORAL EVERY 12 HOURS
Qty: 20 TABLET | Refills: 0 | Status: SHIPPED | OUTPATIENT
Start: 2024-05-06

## 2024-05-06 NOTE — PROGRESS NOTES
Patient ID: Mau Hebert is a 24 y.o. female.    Chief Complaint: URI (Entered automatically based on patient selection in Patient Portal.)    The patient initiated a request through "[employer] on 5/6/2024 for evaluation and management with a chief complaint of URI (Entered automatically based on patient selection in Patient Portal.)     I evaluated the questionnaire submission on 05/06/2024.    Ohs Peq Evisit Upper Respitatory/Cough Questionnaire    5/6/2024  9:26 AM CDT - Filed by Patient   Do you agree to participate in an E-Visit? Yes   If you have any of the following symptoms, please present to your local ER or call 911:  I acknowledge   What is the main issue you would like addressed today? Sore and swollen throat   Are you able to take your vital signs? No   Are you pregnant, could you be pregnant, or are you breast feeding? None of the above   What symptoms do you currently have?  Headache;  Sore throat;  Ear pain   Have you had any of the following? Difficulty swallowing   Please enter a few details about your swallowing, breathing, or visual problems. I feel pain the most on my left side which is also causig my left ear pain, hurts when i swallow   Have you ever smoked? I currently smoke   Have you had a fever? No   When did your symptoms first appear? 5/3/2024   In the last two weeks, have you been in close contact with someone who has COVID-19 or the Flu? No   In the last two weeks, have you worked or volunteered in a healthcare facility or as a ? Healthcare facilities include a hospital, medical or dental clinic, long-term care facility, or nursing home Yes   Do you live in a long-term care facility, nursing home, group home, or homeless shelter? No   List what you have done or taken to help your symptoms. Throat lozenges, sore throat spray, ibuprofen, cough medicine (to numb the pain), and tea   How severe are your symptoms? Severe   Have your symptoms improved since they first  appeared? Worse   Have you taken an at home Covid test? No   Have you taken a Flu test? No   Have you been fully vaccinated for COVID? (2 Pfizer, 2 Moderna or 1 Gregory & Gregory vaccine injections) Yes   Have you received a booster? No   Have you recieved a Flu shot? Yes   When did you recieve your Flu shot? 1/10/2024   Do you have transportation to get tested for COVID if it is indicated and ordered for you at an [facility] location? Yes   Provide any additional information you feel is important. I dont not have a cough or body aches. Last night the pain did become really bad so i put a hot compress on my throat and drank cough syrup to stop the pain. I was able to sleep! Throat lozenges are not helping st this point.   Please attach any relevant images or files          Encounter Diagnosis   Name Primary?    Recurrent tonsillitis Yes        Orders Placed This Encounter   Procedures    Ambulatory referral/consult to ENT     Standing Status:   Future     Standing Expiration Date:   6/6/2025     Referral Priority:   Routine     Referral Type:   Consultation     Referral Reason:   Specialty Services Required     Referred to Provider:   Ga Granados MD     Requested Specialty:   Otolaryngology     Number of Visits Requested:   1      Medications Ordered This Encounter   Medications    amoxicillin-clavulanate 875-125mg (AUGMENTIN) 875-125 mg per tablet     Sig: Take 1 tablet by mouth every 12 (twelve) hours.     Dispense:  20 tablet     Refill:  0    predniSONE (DELTASONE) 20 MG tablet     Sig: One tablet orally twice daily for 3 days and then once daily for 2 days     Dispense:  8 tablet     Refill:  0        No follow-ups on file.      E-Visit Time Tracking:    Day 1 Time (in minutes): 7    Total Time (in minutes): 7

## 2024-07-01 ENCOUNTER — TELEPHONE (OUTPATIENT)
Dept: FAMILY MEDICINE | Facility: CLINIC | Age: 24
End: 2024-07-01
Payer: COMMERCIAL

## 2024-07-01 NOTE — LETTER
AUTHORIZATION FOR RELEASE OF   CONFIDENTIAL INFORMATION    Dear Dr Granados,    We are seeing Mau Hebert, date of birth 2000, in the clinic at [facility]. Michelle Herron FNP is the patient's PCP. Mau Hebert has an outstanding lab/procedure at the time we reviewed her chart. In order to help keep her health information updated, she has authorized us to request the following medical record(s):        (  )  MAMMOGRAM                                      (  )  COLONOSCOPY      (  )  PAP SMEAR                                          (  )  OUTSIDE LAB RESULTS     (  )  DEXA SCAN                                          (  )  EYE EXAM            (  )  FOOT EXAM                                          (  )  ENTIRE RECORD     (  )  OUTSIDE IMMUNIZATIONS                 (X)  LAST OV         Please fax records to [facility], [place], Kathryn F., FNP, [phone]     If you have any questions, please contact us at [phone].           Patient Name: Mau Hebert  : 2000  Patient Phone #: [phone]

## 2024-07-01 NOTE — TELEPHONE ENCOUNTER
1. Are there any outstanding tasks in the patient's chart? (Ex. Labs, MMG)? NO     2. Do we have any outstanding/Pending referrals? NO     3. Has the patient been seen in an ER, Urgent Care or been admitted to the hospital since last office visit with our office? NO     4. Has the patient seen any other health care provider (doctors) since last visit? ENT- [facility] SENT     5. Has the patient had any blood work or x-rays done since last visit? NO

## 2024-07-08 NOTE — PROGRESS NOTES
TELEMEDICINE VISIT     Patient ID: Mau Hebert is a 24 y.o. female.  MRN: [id number]  : 2000    Subjective:        TELEMEDICINE  The patient location is: home  The chief complaint leading to consultation is: ADHD (3m fu) and Anxiety (3m fu)     Visit type: Virtual visit with synchronous audio and video    Total time spent with patient: 15 minutes  20 minutes of total time spent on the encounter, which includes face to face time and non-face to face time preparing to see the patient (eg, review of tests), obtaining and/or reviewing separately obtained history, documenting clinical information in the electronic or other health record, independently interpreting results (not separately reported) and communicating results to the patient/family/caregiver, or care coordination (not separately reported).    Each patient to whom he or she provides medical services by telemedicine is:  (1) informed of the relationship between the physician and patient and the respective role of any other health care provider with respect to management of the patient; and (2) notified that he or she may decline to receive medical services by telemedicine and may withdraw from such care at any time.    HPI: HPI   This is a 24-year-old white female who is seen today via virtual visit for three-month follow-up for ADHD and anxiety.  Reports stopped her Viibryd after stopping her birth control and feels like she is doing great without it.  No complaints.    Health maintenance reviewed with the patient.  Health maintenance completed:  Health Maintenance Topics with due status: Not Due       Topic Last Completion Date    TETANUS VACCINE 2021    Influenza Vaccine 10/12/2023    Pap Smear 2024      Health maintenance due:  There are no preventive care reminders to display for this patient.   ROS:  Review of Systems   Constitutional:  Negative for activity change and unexpected weight change.   HENT:  Negative for hearing  loss, rhinorrhea and trouble swallowing.    Eyes:  Negative for discharge and visual disturbance.   Respiratory:  Negative for chest tightness and wheezing.    Cardiovascular:  Negative for chest pain and palpitations.   Gastrointestinal:  Negative for blood in stool, constipation, diarrhea and vomiting.   Endocrine: Negative for polydipsia and polyuria.   Genitourinary:  Negative for difficulty urinating, dysuria, hematuria and menstrual problem.   Musculoskeletal:  Negative for arthralgias, joint swelling and neck pain.   Neurological:  Negative for weakness and headaches.   Psychiatric/Behavioral:  Negative for confusion and dysphoric mood.       Complete ROS negative except as stated in HPI  History:     Past Medical History:   Diagnosis Date    ADHD (attention deficit hyperactivity disorder)     Bilateral arm fractures     History of laparoscopic appendectomy     History of mononucleosis     MRSA infection     SVT (supraventricular tachycardia)     Vasovagal syncope       Past Surgical History:   Procedure Laterality Date    APPENDECTOMY  2016    INCISION AND DRAINAGE OF WOUND       Family History   Problem Relation Name Age of Onset    Depression Mother Misit Ransonet         Multiple family memebers on both sides.    Hearing loss Father Christopher     Hyperlipidemia Father Christopher     Hypertension Father Christopher     No Known Problems Brother x 2     Hearing loss Maternal Grandmother Jeanna     No Known Problems Maternal Grandfather      No Known Problems Paternal Grandmother      Hearing loss Paternal Grandfather Enzo america     Learning disabilities Brother Bull amer[place]         Aspergers    Congenital heart disease Neg Hx      Pacemaker/defibrilator Neg Hx      Arrhythmia Neg Hx      Early death Neg Hx      Heart attacks under age 50 Neg Hx      Deafness Neg Hx      Long QT syndrome Neg Hx      Seizures Neg Hx        Social History     Tobacco Use    Smoking status: Former     Types: Vaping with  "nicotine    Smokeless tobacco: Never   Substance and Sexual Activity    Alcohol use: Yes     Comment: rarely    Drug use: Yes     Types: Marijuana    Sexual activity: Yes     Partners: Male          Allergies:   Review of patient's allergies indicates:   Allergen Reactions    Amitriptyline Other (See Comments)     Felt out of it. Eye would not open.     Objective:     Vitals:    07/09/24 1136   BP: 120/86   Pulse: 100   Weight: 79.4 kg (175 lb)   Height: 5' 6" (1.676 m)         Physical Examination:   Physical Exam  Vitals and nursing note reviewed.   Constitutional:       Appearance: Normal appearance.   HENT:      Head: Normocephalic and atraumatic.   Neurological:      General: No focal deficit present.      Mental Status: She is alert and oriented to person, place, and time.   Psychiatric:         Mood and Affect: Mood normal.         Behavior: Behavior normal.         Thought Content: Thought content normal.         Judgment: Judgment normal.           Medications:     Medication List with Changes/Refills   Current Medications    ALPRAZOLAM (XANAX) 0.25 MG TABLET    Take 1 tablet (0.25 mg total) by mouth 2 (two) times daily as needed for Anxiety.    FLUTICASONE PROPIONATE (FLONASE) 50 MCG/ACTUATION NASAL SPRAY    2 sprays by Each Nostril route daily as needed.   Changed and/or Refilled Medications    Modified Medication Previous Medication    SERDEXMETHYLPHEN-DEXMETHYLPHEN (AZSTARYS) 39.2 MG- 7.8 MG CAP serdexmethylphen-dexmethylphen (AZSTARYS) 39.2 mg- 7.8 mg Cap       Take 1 capsule by mouth Daily.    Take 1 capsule by mouth Daily.   Discontinued Medications    AMOXICILLIN-CLAVULANATE 875-125MG (AUGMENTIN) 875-125 MG PER TABLET    Take 1 tablet by mouth every 12 (twelve) hours.    PREDNISONE (DELTASONE) 20 MG TABLET    One tablet orally twice daily for 3 days and then once daily for 2 days    VILAZODONE (VIIBRYD) 20 MG TAB    Take 1 tablet (20 mg total) by mouth once daily.     Assessment and Plan       " ICD-10-CM ICD-9-CM   1. Attention deficit hyperactivity disorder (ADHD), unspecified ADHD type  F90.9 314.01   2. Generalized anxiety disorder  F41.1 300.02     1. Attention deficit hyperactivity disorder (ADHD), unspecified ADHD type  Overview:  DX as child  Previously Vyvanse 30mg daily    01/10/2024 - increase Vyvanse to 40 mg daily    01/11/2024 - Vyvanse over $300, new RX sent for Azstarys 39.2/7.8 mg daily    Assessment & Plan:  Stable, continue Azstarys, follow-up 3 months with virtual visit.    Orders:  -     serdexmethylphen-dexmethylphen (AZSTARYS) 39.2 mg- 7.8 mg Cap; Take 1 capsule by mouth Daily.  Dispense: 30 capsule; Refill: 0    2. Generalized anxiety disorder  Overview:  Lexapro and Viibryd in past    Current - Xanax 0.25 mg BID prn     03/06/2023 - Viibryd 20 mg daily (discontinued June 2024)    Assessment & Plan:  Stopped her Viibryd last month and doing very well without it.  Continue Xanax as needed, follow-up 3 months with virtual visit.                Follow Up:   Follow up in about 3 months (around 10/9/2024) for Virtual Visit.    I spent greater than 20 minutes today both in chart review and greater than 50% of that time in discussion with the patient regarding health maintenance, diagnoses, diagnostic tests, medications, treatments, symptom management, expected results and adverse effects. Patient verbalized understanding and all questions were answered.

## 2024-07-09 ENCOUNTER — OFFICE VISIT (OUTPATIENT)
Dept: FAMILY MEDICINE | Facility: CLINIC | Age: 24
End: 2024-07-09
Payer: COMMERCIAL

## 2024-07-09 VITALS
HEIGHT: 66 IN | BODY MASS INDEX: 28.12 KG/M2 | SYSTOLIC BLOOD PRESSURE: 120 MMHG | HEART RATE: 100 BPM | WEIGHT: 175 LBS | DIASTOLIC BLOOD PRESSURE: 86 MMHG

## 2024-07-09 DIAGNOSIS — F90.9 ATTENTION DEFICIT HYPERACTIVITY DISORDER (ADHD), UNSPECIFIED ADHD TYPE: Primary | ICD-10-CM

## 2024-07-09 DIAGNOSIS — F41.1 GENERALIZED ANXIETY DISORDER: ICD-10-CM

## 2024-07-09 PROCEDURE — 1160F RVW MEDS BY RX/DR IN RCRD: CPT | Mod: CPTII,95,, | Performed by: NURSE PRACTITIONER

## 2024-07-09 PROCEDURE — 1159F MED LIST DOCD IN RCRD: CPT | Mod: CPTII,95,, | Performed by: NURSE PRACTITIONER

## 2024-07-09 PROCEDURE — 3008F BODY MASS INDEX DOCD: CPT | Mod: CPTII,95,, | Performed by: NURSE PRACTITIONER

## 2024-07-09 PROCEDURE — 3074F SYST BP LT 130 MM HG: CPT | Mod: CPTII,95,, | Performed by: NURSE PRACTITIONER

## 2024-07-09 PROCEDURE — 99213 OFFICE O/P EST LOW 20 MIN: CPT | Mod: 95,,, | Performed by: NURSE PRACTITIONER

## 2024-07-09 PROCEDURE — 3079F DIAST BP 80-89 MM HG: CPT | Mod: CPTII,95,, | Performed by: NURSE PRACTITIONER

## 2024-07-09 RX ORDER — FLUTICASONE PROPIONATE 50 MCG
2 SPRAY, SUSPENSION (ML) NASAL DAILY PRN
COMMUNITY
Start: 2024-06-03

## 2024-07-09 RX ORDER — SERDEXMETHYLPHENIDATE AND DEXMETHYLPHENIDATE 7.8; 39.2 MG/1; MG/1
1 CAPSULE ORAL DAILY
Qty: 30 CAPSULE | Refills: 0 | Status: SHIPPED | OUTPATIENT
Start: 2024-07-09

## 2024-07-09 NOTE — ASSESSMENT & PLAN NOTE
Stopped her Viibryd last month and doing very well without it.  Continue Xanax as needed, follow-up 3 months with virtual visit.

## 2024-07-16 ENCOUNTER — DOCUMENTATION ONLY (OUTPATIENT)
Facility: CLINIC | Age: 24
End: 2024-07-16
Payer: COMMERCIAL

## 2024-08-27 ENCOUNTER — TELEPHONE (OUTPATIENT)
Dept: FAMILY MEDICINE | Facility: CLINIC | Age: 24
End: 2024-08-27
Payer: COMMERCIAL

## 2024-08-27 NOTE — TELEPHONE ENCOUNTER
----- Message from Daiana Gonzalez RN sent at 8/27/2024  7:34 AM CDT -----  Regarding: chlamydia screen  Patient is overdue for chlamydia screen.  Can you pleases order an contact the patient?

## 2024-09-09 DIAGNOSIS — F90.9 ATTENTION DEFICIT HYPERACTIVITY DISORDER (ADHD), UNSPECIFIED ADHD TYPE: ICD-10-CM

## 2024-09-09 RX ORDER — SERDEXMETHYLPHENIDATE AND DEXMETHYLPHENIDATE 7.8; 39.2 MG/1; MG/1
1 CAPSULE ORAL DAILY
Qty: 30 CAPSULE | Refills: 0 | Status: SHIPPED | OUTPATIENT
Start: 2024-09-09

## 2024-10-02 ENCOUNTER — TELEPHONE (OUTPATIENT)
Dept: FAMILY MEDICINE | Facility: CLINIC | Age: 24
End: 2024-10-02
Payer: COMMERCIAL

## 2024-10-09 ENCOUNTER — OFFICE VISIT (OUTPATIENT)
Dept: FAMILY MEDICINE | Facility: CLINIC | Age: 24
End: 2024-10-09
Payer: COMMERCIAL

## 2024-10-09 VITALS
TEMPERATURE: 97 F | HEART RATE: 83 BPM | DIASTOLIC BLOOD PRESSURE: 80 MMHG | WEIGHT: 175 LBS | BODY MASS INDEX: 28.12 KG/M2 | SYSTOLIC BLOOD PRESSURE: 114 MMHG | HEIGHT: 66 IN

## 2024-10-09 DIAGNOSIS — F41.1 GENERALIZED ANXIETY DISORDER: ICD-10-CM

## 2024-10-09 DIAGNOSIS — Z11.8 SCREENING FOR CHLAMYDIAL DISEASE: ICD-10-CM

## 2024-10-09 DIAGNOSIS — F90.9 ATTENTION DEFICIT HYPERACTIVITY DISORDER (ADHD), UNSPECIFIED ADHD TYPE: Primary | ICD-10-CM

## 2024-10-09 DIAGNOSIS — Z23 NEED FOR INFLUENZA VACCINATION: ICD-10-CM

## 2024-10-09 PROCEDURE — 3008F BODY MASS INDEX DOCD: CPT | Mod: CPTII,95,, | Performed by: NURSE PRACTITIONER

## 2024-10-09 PROCEDURE — 3074F SYST BP LT 130 MM HG: CPT | Mod: CPTII,95,, | Performed by: NURSE PRACTITIONER

## 2024-10-09 PROCEDURE — 1159F MED LIST DOCD IN RCRD: CPT | Mod: CPTII,95,, | Performed by: NURSE PRACTITIONER

## 2024-10-09 PROCEDURE — 3079F DIAST BP 80-89 MM HG: CPT | Mod: CPTII,95,, | Performed by: NURSE PRACTITIONER

## 2024-10-09 PROCEDURE — 1160F RVW MEDS BY RX/DR IN RCRD: CPT | Mod: CPTII,95,, | Performed by: NURSE PRACTITIONER

## 2024-10-09 PROCEDURE — 99213 OFFICE O/P EST LOW 20 MIN: CPT | Mod: 95,,, | Performed by: NURSE PRACTITIONER

## 2024-10-09 RX ORDER — NORETHINDRONE ACETATE AND ETHINYL ESTRADIOL AND FERROUS FUMARATE 1MG-20(24)
1 KIT ORAL DAILY
COMMUNITY
Start: 2024-10-08 | End: 2024-10-09

## 2024-10-09 RX ORDER — SERDEXMETHYLPHENIDATE AND DEXMETHYLPHENIDATE 7.8; 39.2 MG/1; MG/1
1 CAPSULE ORAL DAILY
Qty: 30 CAPSULE | Refills: 0 | Status: SHIPPED | OUTPATIENT
Start: 2024-10-09

## 2024-10-09 NOTE — PROGRESS NOTES
TELEMEDICINE VISIT     Patient ID: Mau Hebert is a 24 y.o. female.  MRN: [id number]  : 2000    Subjective:        TELEMEDICINE  The patient location is: home  The chief complaint leading to consultation is: ADHD (3m fu) and Anxiety (3m fu)     Visit type: Virtual visit with synchronous audio and video    Total time spent with patient: 15 minutes  20 minutes of total time spent on the encounter, which includes face to face time and non-face to face time preparing to see the patient (eg, review of tests), obtaining and/or reviewing separately obtained history, documenting clinical information in the electronic or other health record, independently interpreting results (not separately reported) and communicating results to the patient/family/caregiver, or care coordination (not separately reported).    Each patient to whom he or she provides medical services by telemedicine is:  (1) informed of the relationship between the physician and patient and the respective role of any other health care provider with respect to management of the patient; and (2) notified that he or she may decline to receive medical services by telemedicine and may withdraw from such care at any time.    HPI: HPI   This has a 24-year-old white female who is seen today via virtual visit for three-month follow-up for ADHD, anxiety.  Reports doing well with her medication and denies any side effects.  No complaints today.    Health maintenance reviewed with the patient.  Health maintenance completed:  Health Maintenance Topics with due status: Not Due       Topic Last Completion Date    TETANUS VACCINE 2021    Pap Smear 2024    RSV Vaccine (Age 60+ and Pregnant patients) Not Due      Health maintenance due:  Health Maintenance Due   Topic Date Due    Influenza Vaccine (1) 2024      ROS:  Review of Systems   Constitutional:  Negative for activity change and unexpected weight change.   HENT:  Negative for hearing  loss, rhinorrhea and trouble swallowing.    Eyes:  Negative for discharge and visual disturbance.   Respiratory:  Negative for chest tightness and wheezing.    Cardiovascular:  Negative for chest pain and palpitations.   Gastrointestinal:  Negative for blood in stool, constipation, diarrhea and vomiting.   Endocrine: Negative for polydipsia and polyuria.   Genitourinary:  Negative for difficulty urinating, dysuria, hematuria and menstrual problem.   Musculoskeletal:  Negative for arthralgias, joint swelling and neck pain.   Neurological:  Negative for weakness and headaches.   Psychiatric/Behavioral:  Negative for confusion and dysphoric mood.       Complete ROS negative except as stated in HPI  History:     Past Medical History:   Diagnosis Date    ADHD (attention deficit hyperactivity disorder)     Bilateral arm fractures     History of laparoscopic appendectomy     History of mononucleosis     MRSA infection     SVT (supraventricular tachycardia)     Vasovagal syncope       Past Surgical History:   Procedure Laterality Date    APPENDECTOMY  2016    INCISION AND DRAINAGE OF WOUND       Family History   Problem Relation Name Age of Onset    Depression Mother Misit Ransonet         Multiple family memebers on both sides.    Hearing loss Father Christopher     Hyperlipidemia Father Christopher     Hypertension Father Christopher     No Known Problems Brother x 2     Hearing loss Maternal Grandmother Jeanna     No Known Problems Maternal Grandfather      No Known Problems Paternal Grandmother      Hearing loss Paternal Grandfather Enzo america     Learning disabilities Brother Bull [place]         Aspergers    Congenital heart disease Neg Hx      Pacemaker/defibrilator Neg Hx      Arrhythmia Neg Hx      Early death Neg Hx      Heart attacks under age 50 Neg Hx      Deafness Neg Hx      Long QT syndrome Neg Hx      Seizures Neg Hx        Social History     Tobacco Use    Smoking status: Former     Types: Vaping with  "nicotine    Smokeless tobacco: Never   Substance and Sexual Activity    Alcohol use: Yes     Comment: rarely    Drug use: Yes     Types: Marijuana    Sexual activity: Yes     Partners: Male          Allergies:   Review of patient's allergies indicates:   Allergen Reactions    Amitriptyline Other (See Comments)     Felt out of it. Eye would not open.     Objective:     Vitals:    10/09/24 1142   BP: 114/80   Pulse: 83   Temp: 97.2 °F (36.2 °C)   Weight: 79.4 kg (175 lb)   Height: 5' 6" (1.676 m)         Physical Examination:   Physical Exam  Vitals and nursing note reviewed.   Constitutional:       Appearance: Normal appearance.   HENT:      Head: Normocephalic and atraumatic.   Neurological:      General: No focal deficit present.      Mental Status: She is alert and oriented to person, place, and time.   Psychiatric:         Mood and Affect: Mood normal.         Behavior: Behavior normal.         Thought Content: Thought content normal.         Judgment: Judgment normal.           Medications:     Medication List with Changes/Refills   Current Medications    ALPRAZOLAM (XANAX) 0.25 MG TABLET    Take 1 tablet (0.25 mg total) by mouth 2 (two) times daily as needed for Anxiety.    FLUTICASONE PROPIONATE (FLONASE) 50 MCG/ACTUATION NASAL SPRAY    2 sprays by Each Nostril route daily as needed.   Changed and/or Refilled Medications    Modified Medication Previous Medication    SERDEXMETHYLPHEN-DEXMETHYLPHEN (AZSTARYS) 39.2 MG- 7.8 MG CAP serdexmethylphen-dexmethylphen (AZSTARYS) 39.2 mg- 7.8 mg Cap       Take 1 capsule by mouth Daily.    Take 1 capsule by mouth Daily.   Discontinued Medications    JUNEL FE 24 1 MG-20 MCG (24)/75 MG (4) PER TABLET    Take 1 tablet by mouth once daily.     Assessment and Plan       ICD-10-CM ICD-9-CM   1. Attention deficit hyperactivity disorder (ADHD), unspecified ADHD type  F90.9 314.01   2. Generalized anxiety disorder  F41.1 300.02   3. Need for influenza vaccination  Z23 V04.81   4. " Screening for chlamydial disease  Z11.8 V73.98     1. Attention deficit hyperactivity disorder (ADHD), unspecified ADHD type  Overview:  DX as child  Previously Vyvanse 30mg daily    01/10/2024 - increase Vyvanse to 40 mg daily    01/11/2024 - Vyvanse over $300, new RX sent for Azstarys 39.2/7.8 mg daily    Assessment & Plan:  Stable, continue Azstarys, follow-up 3 months with virtual visit.    Orders:  -     serdexmethylphen-dexmethylphen (AZSTARYS) 39.2 mg- 7.8 mg Cap; Take 1 capsule by mouth Daily.  Dispense: 30 capsule; Refill: 0    2. Generalized anxiety disorder  Overview:  Lexapro and Viibryd in past    Current - Xanax 0.25 mg BID prn     03/06/2023 - Viibryd 20 mg daily (discontinued June 2024)    Assessment & Plan:  Stable, continue Xanax as needed, follow-up 3 months with virtual visit.      3. Need for influenza vaccination  Comments:  Will get flu vaccine at work last week of October    4. Screening for chlamydial disease  Comments:  Patient declines chlamydia screening              Follow Up:   Follow up in about 3 months (around 1/9/2025) for Virtual Visit.    I spent greater than 20 minutes today both in chart review and greater than 50% of that time in discussion with the patient regarding health maintenance, diagnoses, diagnostic tests, medications, treatments, symptom management, expected results and adverse effects. Patient verbalized understanding and all questions were answered.

## 2024-10-23 ENCOUNTER — PATIENT MESSAGE (OUTPATIENT)
Dept: FAMILY MEDICINE | Facility: CLINIC | Age: 24
End: 2024-10-23
Payer: COMMERCIAL

## 2024-11-12 DIAGNOSIS — F90.9 ATTENTION DEFICIT HYPERACTIVITY DISORDER (ADHD), UNSPECIFIED ADHD TYPE: ICD-10-CM

## 2024-11-12 RX ORDER — SERDEXMETHYLPHENIDATE AND DEXMETHYLPHENIDATE 7.8; 39.2 MG/1; MG/1
1 CAPSULE ORAL DAILY
Qty: 30 CAPSULE | Refills: 0 | Status: SHIPPED | OUTPATIENT
Start: 2024-11-12

## 2025-03-27 ENCOUNTER — TELEPHONE (OUTPATIENT)
Dept: FAMILY MEDICINE | Facility: CLINIC | Age: 25
End: 2025-03-27
Payer: COMMERCIAL

## 2025-03-27 DIAGNOSIS — Z13.1 SCREENING FOR DIABETES MELLITUS: ICD-10-CM

## 2025-03-27 DIAGNOSIS — Z00.00 ANNUAL PHYSICAL EXAM: Primary | ICD-10-CM

## 2025-03-27 NOTE — TELEPHONE ENCOUNTER
Attempted to contact patient on 3/27/25 at 8:32 for previsit. LV reminding patient to complete labs before her visit.   Statement Selected

## 2025-04-14 ENCOUNTER — TELEPHONE (OUTPATIENT)
Dept: FAMILY MEDICINE | Facility: CLINIC | Age: 25
End: 2025-04-14
Payer: COMMERCIAL

## 2025-04-14 NOTE — TELEPHONE ENCOUNTER
Attempted to contact patient on 4/14/25 at 10:25. LV reminding patient about her visit with labs needed prior to appointment.

## 2025-04-15 ENCOUNTER — DOCUMENTATION ONLY (OUTPATIENT)
Dept: FAMILY MEDICINE | Facility: CLINIC | Age: 25
End: 2025-04-15
Payer: COMMERCIAL

## 2025-04-15 ENCOUNTER — RESULTS FOLLOW-UP (OUTPATIENT)
Dept: FAMILY MEDICINE | Facility: CLINIC | Age: 25
End: 2025-04-15

## 2025-04-15 LAB
% NEUTROPHILS (OHS): 57
ALBUMIN SERPL BCP-MCNC: 4.8 G/DL (ref 4–5)
ALP SERPL-CCNC: 94 U/L (ref 44–121)
ALT SERPL-CCNC: 15 U/L (ref 0–32)
AST SERPL-CCNC: 15 U/L (ref 0–40)
BASOPHILS NFR BLD: 0.1 K/UL (ref 0–0.2)
BASOPHILS NFR BLD: 1 %
BILIRUB SERPL-MCNC: 0.4 MG/DL (ref 0–1.2)
CALCIUM SERPL-MCNC: 9.7 MG/DL (ref 8.7–10.2)
CHLORIDE: 100 MMOL/L (ref 96–106)
CHOLEST SERPL-MCNC: 141 MG/DL (ref 0–149)
CHOLEST SERPL-MCNC: 230 MG/DL (ref 100–199)
CO2 SERPL-SCNC: 21 MMOL/L (ref 20–29)
CREAT/UREA NIT SERPL: 15 (ref 9–23)
CREATINE, SERUM: 0.75 (ref 0.57–1)
EGFR: 113
EOS%, CSF: 4 %
EOSINOPHIL NFR BLD: 0.3 K/UL (ref 0–0.4)
ERYTHROCYTE [DISTWIDTH] IN BLOOD BY AUTOMATED COUNT: 13.4 % (ref 11.7–15.4)
GLOBULIN SER-MCNC: 2.7 GM/DL (ref 1.5–4.5)
GLUCOSE: 68 MG/DL (ref 70–99)
HBA1C MFR BLD: 5.2 % (ref 4–6)
HCT VFR BLD AUTO: 41.4 % (ref 34–46.6)
HDLC SERPL-MCNC: 64 MG/DL
HGB BLD-MCNC: 13.4 G/DL (ref 11.1–15.9)
LDLC SERPL CALC-MCNC: 141 MG/DL (ref 0–99)
LYMPH #: 2.5 K/UL (ref 0.7–3.1)
LYMPH%: 31 %
MCH RBC QN AUTO: 28.5 PG (ref 26.6–33)
MCHC RBC AUTO-ENTMCNC: 32.4 G/DL (ref 31.5–35.7)
MCV RBC AUTO: 88 FL (ref 79–97)
MONOCYTES %: 7
MONOCYTES NFR BLD MANUAL: 1 % (ref 0–1)
NEUTROPHILS NFR BLD: 4.4 % (ref 1.4–7)
PLATELET # BLD AUTO: 336 K/UL (ref 150–450)
POTASSIUM: 4 MMOL/L (ref 3.5–5.2)
PROT SERPL-MCNC: 7.5 G/DL (ref 6–8.5)
RBC # BLD AUTO: 4.7 M/UL (ref 3.77–5.28)
SODIUM: 140 MMOL/L (ref 134–144)
TSH SERPL DL<=0.005 MIU/L-ACNC: 0.89 UIU/ML (ref 0.41–5.9)
UREA NITROGEN (BUN): 11 MG/DL (ref 6–20)
VLDLC SERPL-MCNC: 25 MG/DL (ref 5–40)
WBC # BLD AUTO: 8 K/UL (ref 3.4–10.8)

## 2025-04-21 ENCOUNTER — OFFICE VISIT (OUTPATIENT)
Dept: FAMILY MEDICINE | Facility: CLINIC | Age: 25
End: 2025-04-21
Payer: COMMERCIAL

## 2025-04-21 VITALS
DIASTOLIC BLOOD PRESSURE: 73 MMHG | HEIGHT: 66 IN | SYSTOLIC BLOOD PRESSURE: 103 MMHG | RESPIRATION RATE: 16 BRPM | WEIGHT: 186 LBS | BODY MASS INDEX: 29.89 KG/M2 | HEART RATE: 91 BPM | OXYGEN SATURATION: 99 %

## 2025-04-21 DIAGNOSIS — F41.1 GENERALIZED ANXIETY DISORDER: ICD-10-CM

## 2025-04-21 DIAGNOSIS — E78.2 MODERATE MIXED HYPERLIPIDEMIA NOT REQUIRING STATIN THERAPY: ICD-10-CM

## 2025-04-21 DIAGNOSIS — Z00.00 ANNUAL PHYSICAL EXAM: Primary | ICD-10-CM

## 2025-04-21 DIAGNOSIS — F90.9 ATTENTION DEFICIT HYPERACTIVITY DISORDER (ADHD), UNSPECIFIED ADHD TYPE: ICD-10-CM

## 2025-04-21 PROCEDURE — 1160F RVW MEDS BY RX/DR IN RCRD: CPT | Mod: CPTII,,, | Performed by: NURSE PRACTITIONER

## 2025-04-21 PROCEDURE — 3008F BODY MASS INDEX DOCD: CPT | Mod: CPTII,,, | Performed by: NURSE PRACTITIONER

## 2025-04-21 PROCEDURE — 3078F DIAST BP <80 MM HG: CPT | Mod: CPTII,,, | Performed by: NURSE PRACTITIONER

## 2025-04-21 PROCEDURE — 99395 PREV VISIT EST AGE 18-39: CPT | Mod: ,,, | Performed by: NURSE PRACTITIONER

## 2025-04-21 PROCEDURE — 1159F MED LIST DOCD IN RCRD: CPT | Mod: CPTII,,, | Performed by: NURSE PRACTITIONER

## 2025-04-21 PROCEDURE — 3044F HG A1C LEVEL LT 7.0%: CPT | Mod: CPTII,,, | Performed by: NURSE PRACTITIONER

## 2025-04-21 PROCEDURE — 3074F SYST BP LT 130 MM HG: CPT | Mod: CPTII,,, | Performed by: NURSE PRACTITIONER

## 2025-04-21 RX ORDER — SERDEXMETHYLPHENIDATE AND DEXMETHYLPHENIDATE 7.8; 39.2 MG/1; MG/1
1 CAPSULE ORAL DAILY
Qty: 30 CAPSULE | Refills: 0 | Status: SHIPPED | OUTPATIENT
Start: 2025-04-21

## 2025-04-21 RX ORDER — ALPRAZOLAM 0.25 MG/1
0.25 TABLET ORAL 2 TIMES DAILY PRN
Qty: 60 TABLET | Refills: 0 | Status: SHIPPED | OUTPATIENT
Start: 2025-04-21

## 2025-04-21 NOTE — PROGRESS NOTES
Subjective:       Patient ID: Mau Hebert is a 25 y.o. female.    Chief Complaint: Annual Exam      History of Present Illness    CHIEF COMPLAINT:  Patient presents today for wellness visit and to review labs    LABS:  Total cholesterol was 230 with LDL of 141, showing improvement from previous results. A1c was 5.2. Thyroid function tests were normal.    ADHD:  She discontinued ADHD medication with subsequent development of brain fog and decreased mental sharpness, particularly affecting work performance. She expresses desire to resume medication.    MEDICATIONS:  She has Xanax for use as needed, which she rarely takes.    CONTRACEPTION:  She is not currently using birth control and reports not actively trying to conceive. She and her partner plan to delay pregnancy attempts until after marriage.    HEALTH AND LIFESTYLE:  She has initiated an exercise regimen and made dietary improvements. She is a current smoker.    DENTAL:  She plans to have wisdom teeth removed at the end of this year.    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 She notes never having flu prior to starting annual vaccinations when entering the healthcare field.        Review of Systems  Comprehensive review of systems negative except as stated in HPI    The patient's Health Maintenance was reviewed and the following appears to be due:   There are no preventive care reminders to display for this patient.    Past Medical History:  Past Medical History:   Diagnosis Date    ADHD (attention deficit hyperactivity disorder)     Bilateral arm fractures     History of laparoscopic appendectomy     History of mononucleosis     MRSA infection     SVT (supraventricular tachycardia)     Vasovagal syncope      Past Surgical History:   Procedure Laterality Date    APPENDECTOMY  2016    INCISION AND DRAINAGE OF WOUND       Review of patient's  "allergies indicates:   Allergen Reactions    Amitriptyline Other (See Comments)     Felt out of it. Eye would not open.     Medications Ordered Prior to Encounter[1]  Social History[2]  Family History   Problem Relation Name Age of Onset    Depression Mother Wili Giraldo         Multiple family memebers on both sides.    Hearing loss Father Christopher     Hyperlipidemia Father Christopher     Hypertension Father Christopher     No Known Problems Brother x 2     Hearing loss Maternal Grandmother Jeanna     No Known Problems Maternal Grandfather      No Known Problems Paternal Grandmother      Hearing loss Paternal Grandfather Enzo [place]     Learning disabilities Brother Bull [place]         Aspergers    Congenital heart disease Neg Hx      Pacemaker/defibrilator Neg Hx      Arrhythmia Neg Hx      Early death Neg Hx      Heart attacks under age 50 Neg Hx      Deafness Neg Hx      Long QT syndrome Neg Hx      Seizures Neg Hx         Objective:       /73 (BP Location: Left arm)   Pulse 91   Resp 16   Ht 5' 6" (1.676 m)   Wt 84.4 kg (186 lb)   SpO2 99%   BMI 30.02 kg/m²      Physical Exam  Vitals and nursing note reviewed.   Constitutional:       Appearance: Normal appearance.   HENT:      Head: Normocephalic and atraumatic.      Right Ear: Tympanic membrane, ear canal and external ear normal.      Left Ear: Tympanic membrane, ear canal and external ear normal.      Nose: Nose normal.      Mouth/Throat:      Mouth: Mucous membranes are moist.      Pharynx: Oropharynx is clear.   Eyes:      Extraocular Movements: Extraocular movements intact.      Conjunctiva/sclera: Conjunctivae normal.      Pupils: Pupils are equal, round, and reactive to light.   Cardiovascular:      Rate and Rhythm: Normal rate and regular rhythm.      Heart sounds: Normal heart sounds.   Pulmonary:      Effort: Pulmonary effort is normal.      Breath sounds: Normal breath sounds.   Abdominal:      General: Abdomen is flat. Bowel sounds " are normal.      Palpations: Abdomen is soft.   Musculoskeletal:         General: Normal range of motion.      Cervical back: Normal range of motion and neck supple.   Skin:     General: Skin is warm and dry.   Neurological:      General: No focal deficit present.      Mental Status: She is alert and oriented to person, place, and time.   Psychiatric:         Mood and Affect: Mood normal.         Behavior: Behavior normal.         Thought Content: Thought content normal.         Judgment: Judgment normal.         Labs  Documentation Only on 04/15/2025   Component Date Value Ref Range Status    TSH 04/14/2025 0.89  0.41 - 5.90 uIU/mL Final    Hemoglobin A1C 04/14/2025 5.2  4.0 - 6.0 % Final    Cholesterol, Total 04/14/2025 230 (A)  100 - 199 mg/dL Final    Triglycerides 04/14/2025 141  0 - 149 mg/dL Final    HDL 04/14/2025 64  mg/dL Final    VLDL 04/14/2025 25  5 - 40 mg/dL Final    LDL Cholesterol 04/14/2025 141.00 (A)  0.00 - 99.00 mg/dL Final    Glucose 04/14/2025 68 (A)  70 - 99 mg/dL Final    BUN 04/14/2025 11  6 - 20 mg/dL Final    Creatine, Serum 04/14/2025 0.75  0.57 - 1.0 Final    eGFR 04/14/2025 113   Final    BUN/Creatinine Ratio 04/14/2025 15  9 - 23 Final    Sodium 04/14/2025 140  134 - 144 mmol/L Final    Potassium 04/14/2025 4.0  3.5 - 5.2 mmol/L Final    Chloride 04/14/2025 100  96 - 106 mmol/L Final    CO2 04/14/2025 21  20 - 29 mmol/L Final    Calcium 04/14/2025 9.7  8.7 - 10.2 mg/dL Final    Total Protein 04/14/2025 7.5  6.0 - 8.5 g/dL Final    Albumin 04/14/2025 4.8  4.0 - 5.0 g/dL Final    Globulin 04/14/2025 2.7  1.5 - 4.5 gm/dL Final    Bilirubin Total 04/14/2025 0.4  0.0 - 1.2 mg/dL Final    Alkaline Phosphatase 04/14/2025 94  44 - 121 U/L Final    AST 04/14/2025 15  0 - 40 U/L Final    ALT 04/14/2025 15  0 - 32 U/L Final    WBC 04/14/2025 8.0  3.4 - 10.8 K/uL Final    RBC 04/14/2025 4.70  3.77 - 5.28 M/uL Final    Hemoglobin 04/14/2025 13.4  11.1 - 15.9 g/dL Final    Hematocrit 04/14/2025  41.4  34.0 - 46.6 % Final    MCV 04/14/2025 88.0  79.0 - 97.0 fL Final    MCH 04/14/2025 28.5  26.6 - 33.0 pg Final    MCHC 04/14/2025 32.4  31.5 - 35.7 g/dL Final    RDW 04/14/2025 13.4  11.7 - 15.4 % Final    Platelets 04/14/2025 336  150 - 450 K/uL Final    % NEUTROPHILS 04/14/2025 57   Final    Lymph % 04/14/2025 31  % Final    Monocytes % 04/14/2025 7   Final    Eosinophils, CSF 04/14/2025 4  % Final    Basophil % 04/14/2025 1  % Final    Neutrophils 04/14/2025 4.4  1.4 - 7.0 Final    Lymph # 04/14/2025 2.5  0.7 - 3.1 K/uL Final    Monocytes 04/14/2025 1  0 - 1 Final    Eos # 04/14/2025 0.3  0.0 - 0.4 K/uL Final    Baso # 04/14/2025 0.1  0.0 - 0.2 K/uL Final       Assessment and Plan     Assessment & Plan    Z00.00 Annual physical exam  F90.9 ADHD, unspecified ADHD type  F41.1 Generalized anxiety disorder  E78.2 Moderate mixed hyperlipidemia not requiring statin therapy    IMPRESSION:  - Assessed cognitive function and work performance off ADHD medication.  - Evaluated cholesterol levels, noting slight elevation but improvement from previous results.  - Reviewed A1c and thyroid function, both WNL.  - Considered upcoming wisdom teeth extraction and associated risks.    ANNUAL PHYSICAL EXAM:  - Discussed importance of flu vaccine for healthcare workers, emphasizing impossibility of keri flu from the vaccine and increased risk of complications without it.  - Clarified COVID-19 vaccine is no longer mandatory for work.  - Follow up in 1 year for in-person visit.    ATTENTION DEFICIT HYPERACTIVITY DISORDER (ADHD), UNSPECIFIED ADHD TYPE:  - Started Azstarys 39.2/7.8 mg to be taken on working days only.  - Follow up in 3 months for virtual visit.    GENERALIZED ANXIETY DISORDER:  - Continued Xanax as needed.    LIFESTYLE CHANGES:  - Patient to continue current efforts to work out and eat better.  - Informed about dry socket risk associated with smoking after wisdom teeth extraction.  - Advised to avoid smoking,  vaping, and using straws post-extraction to prevent this complication.           1. Annual physical exam  Overview:  Annual exam yearly in March      2. Attention deficit hyperactivity disorder (ADHD), unspecified ADHD type  Overview:  DX as child  Previously Vyvanse 30mg daily    01/10/2024 - increase Vyvanse to 40 mg daily    01/11/2024 - Vyvanse over $300, new RX sent for Azstarys 39.2/7.8 mg daily    Assessment & Plan:  - Started Azstarys 39.2/7.8 mg to be taken on working days only.  - Follow up in 3 months for virtual visit.    Orders:  -     serdexmethylphen-dexmethylphen (AZSTARYS) 39.2 mg- 7.8 mg Cap; Take 1 capsule by mouth Daily.  Dispense: 30 capsule; Refill: 0    3. Generalized anxiety disorder  Overview:  Lexapro and Viibryd in past    Current - Xanax 0.25 mg BID prn     03/06/2023 - Viibryd 20 mg daily (discontinued June 2024)    Assessment & Plan:  - Continued Xanax as needed.    Orders:  -     ALPRAZolam (XANAX) 0.25 MG tablet; Take 1 tablet (0.25 mg total) by mouth 2 (two) times daily as needed for Anxiety.  Dispense: 60 tablet; Refill: 0    4. Moderate mixed hyperlipidemia not requiring statin therapy  Overview:  Diet controlled             Follow up in about 3 months (around 7/21/2025) for Virtual Visit.     This note was generated with the assistance of ambient listening technology. Verbal consent was obtained by the patient and accompanying visitor(s) for the recording of patient appointment to facilitate this note. I attest to having reviewed and edited the generated note for accuracy, though some syntax or spelling errors may persist. Please contact the author of this note for any clarification.            [1]   Current Outpatient Medications on File Prior to Visit   Medication Sig Dispense Refill    fluticasone propionate (FLONASE) 50 mcg/actuation nasal spray 2 sprays by Each Nostril route daily as needed.      [DISCONTINUED] ALPRAZolam (XANAX) 0.25 MG tablet Take 1 tablet (0.25 mg total)  by mouth 2 (two) times daily as needed for Anxiety. 60 tablet 0    [DISCONTINUED] serdexmethylphen-dexmethylphen (AZSTARYS) 39.2 mg- 7.8 mg Cap Take 1 capsule by mouth Daily. (Patient not taking: Reported on 4/21/2025) 30 capsule 0     No current facility-administered medications on file prior to visit.   [2]   Social History  Socioeconomic History    Marital status: Single   Tobacco Use    Smoking status: Former     Types: Vaping with nicotine    Smokeless tobacco: Never   Substance and Sexual Activity    Alcohol use: Yes     Comment: rarely    Drug use: Yes     Types: Marijuana    Sexual activity: Yes     Partners: Male   Social History Narrative    Lives with parents and 2 younger brothers.  Both parents smoke.     Social Drivers of Health     Financial Resource Strain: Low Risk  (4/21/2025)    Overall Financial Resource Strain (CARDIA)     Difficulty of Paying Living Expenses: Not very hard   Food Insecurity: No Food Insecurity (4/21/2025)    Hunger Vital Sign     Worried About Running Out of Food in the Last Year: Never true     Ran Out of Food in the Last Year: Never true   Transportation Needs: No Transportation Needs (4/21/2025)    PRAPARE - Transportation     Lack of Transportation (Medical): No     Lack of Transportation (Non-Medical): No   Physical Activity: Insufficiently Active (4/21/2025)    Exercise Vital Sign     Days of Exercise per Week: 3 days     Minutes of Exercise per Session: 40 min   Stress: Stress Concern Present (4/21/2025)    [employer] [place] [employer] - Occupational Stress Questionnaire     Feeling of Stress : To some extent   Housing Stability: Low Risk  (4/21/2025)    Housing Stability Vital Sign     Unable to Pay for Housing in the Last Year: No     Number of Times Moved in the Last Year: 0     Homeless in the Last Year: No

## 2025-04-21 NOTE — ASSESSMENT & PLAN NOTE
- Started Azstarys 39.2/7.8 mg to be taken on working days only.  - Follow up in 3 months for virtual visit.

## 2025-06-10 DIAGNOSIS — F90.9 ATTENTION DEFICIT HYPERACTIVITY DISORDER (ADHD), UNSPECIFIED ADHD TYPE: ICD-10-CM

## 2025-06-10 RX ORDER — SERDEXMETHYLPHENIDATE AND DEXMETHYLPHENIDATE 7.8; 39.2 MG/1; MG/1
1 CAPSULE ORAL DAILY
Qty: 30 CAPSULE | Refills: 0 | Status: SHIPPED | OUTPATIENT
Start: 2025-06-10

## 2025-07-15 ENCOUNTER — PATIENT MESSAGE (OUTPATIENT)
Dept: FAMILY MEDICINE | Facility: CLINIC | Age: 25
End: 2025-07-15
Payer: COMMERCIAL

## 2025-07-22 ENCOUNTER — OFFICE VISIT (OUTPATIENT)
Dept: FAMILY MEDICINE | Facility: CLINIC | Age: 25
End: 2025-07-22
Payer: COMMERCIAL

## 2025-07-22 VITALS — WEIGHT: 180 LBS | HEIGHT: 66 IN | BODY MASS INDEX: 28.93 KG/M2

## 2025-07-22 DIAGNOSIS — F90.9 ATTENTION DEFICIT HYPERACTIVITY DISORDER (ADHD), UNSPECIFIED ADHD TYPE: Primary | ICD-10-CM

## 2025-07-22 DIAGNOSIS — F41.1 GENERALIZED ANXIETY DISORDER: ICD-10-CM

## 2025-07-22 PROCEDURE — 98006 SYNCH AUDIO-VIDEO EST MOD 30: CPT | Mod: 95,,, | Performed by: NURSE PRACTITIONER

## 2025-07-22 PROCEDURE — G2211 COMPLEX E/M VISIT ADD ON: HCPCS | Mod: 95,,, | Performed by: NURSE PRACTITIONER

## 2025-07-22 PROCEDURE — 3044F HG A1C LEVEL LT 7.0%: CPT | Mod: CPTII,95,, | Performed by: NURSE PRACTITIONER

## 2025-07-22 PROCEDURE — 1160F RVW MEDS BY RX/DR IN RCRD: CPT | Mod: CPTII,95,, | Performed by: NURSE PRACTITIONER

## 2025-07-22 PROCEDURE — 1159F MED LIST DOCD IN RCRD: CPT | Mod: CPTII,95,, | Performed by: NURSE PRACTITIONER

## 2025-07-22 RX ORDER — SERDEXMETHYLPHENIDATE AND DEXMETHYLPHENIDATE 7.8; 39.2 MG/1; MG/1
1 CAPSULE ORAL DAILY
Qty: 30 CAPSULE | Refills: 0 | Status: SHIPPED | OUTPATIENT
Start: 2025-07-22

## 2025-07-22 NOTE — PROGRESS NOTES
TELEMEDICINE VISIT     Patient ID: Mau Hebert is a 25 y.o. female.  MRN: [id number]  : 2000    Subjective:        TELEMEDICINE  The patient location is: home  The chief complaint leading to consultation is: Follow-up     Visit type: Virtual visit with synchronous audio and video    Total time spent with patient: 20 minutes  30 minutes of total time spent on the encounter, which includes face to face time and non-face to face time preparing to see the patient (eg, review of tests), obtaining and/or reviewing separately obtained history, documenting clinical information in the electronic or other health record, independently interpreting results (not separately reported) and communicating results to the patient/family/caregiver, or care coordination (not separately reported).    Each patient to whom he or she provides medical services by telemedicine is:  (1) informed of the relationship between the physician and patient and the respective role of any other health care provider with respect to management of the patient; and (2) notified that he or she may decline to receive medical services by telemedicine and may withdraw from such care at any time.    History of Present Illness    CHIEF COMPLAINT:  Patient seen today via virtual visit for three-month follow-up of ADHD and anxiety.    ADD AND ANXIETY MANAGEMENT:  She reports doing well with Azstarys on work days and uses Xanax as needed. She is comfortable with current usage of both medications and is adherent to the prescribed regimen. She requests a refill for Azstarys but does not require Xanax refill at this time. Overall, she reports no current issues or concerns and states she is doing well.           Health maintenance reviewed with the patient.  Health maintenance completed:  Health Maintenance Topics with due status: Not Due       Topic Last Completion Date    TETANUS VACCINE 2021    Pap Smear 2024    Influenza Vaccine 10/22/2024     RSV Vaccine (Age 60+ and Pregnant patients) Not Due      Health maintenance due:  There are no preventive care reminders to display for this patient.   ROS:  Review of Systems   Constitutional:  Negative for activity change and unexpected weight change.   HENT:  Negative for hearing loss, rhinorrhea and trouble swallowing.    Eyes:  Negative for discharge and visual disturbance.   Respiratory:  Negative for chest tightness and wheezing.    Cardiovascular:  Negative for chest pain and palpitations.   Gastrointestinal:  Negative for blood in stool, constipation, diarrhea and vomiting.   Endocrine: Negative for polydipsia and polyuria.   Genitourinary:  Negative for difficulty urinating, dysuria, hematuria and menstrual problem.   Musculoskeletal:  Negative for arthralgias, joint swelling and neck pain.   Neurological:  Negative for weakness and headaches.   Psychiatric/Behavioral:  Negative for confusion and dysphoric mood.       Complete ROS negative except as stated in HPI  History:     Past Medical History:   Diagnosis Date    ADHD (attention deficit hyperactivity disorder)     Bilateral arm fractures     History of laparoscopic appendectomy     History of mononucleosis     MRSA infection     SVT (supraventricular tachycardia)     Vasovagal syncope       Past Surgical History:   Procedure Laterality Date    APPENDECTOMY  2016    INCISION AND DRAINAGE OF WOUND       Family History   Problem Relation Name Age of Onset    Depression Mother Misit Ransonet         Multiple family memebers on both sides.    Hearing loss Father Christopher     Hyperlipidemia Father Christopher     Hypertension Father Christopher     No Known Problems Brother x 2     Hearing loss Maternal Grandmother Jeanna     No Known Problems Maternal Grandfather      No Known Problems Paternal Grandmother      Hearing loss Paternal Grandfather Enzo [place]     Learning disabilities Brother [place]         Aspergers    Congenital heart disease Neg  "Hx      Pacemaker/defibrilator Neg Hx      Arrhythmia Neg Hx      Early death Neg Hx      Heart attacks under age 50 Neg Hx      Deafness Neg Hx      Long QT syndrome Neg Hx      Seizures Neg Hx        Social History     Tobacco Use    Smoking status: Every Day     Types: Vaping with nicotine    Smokeless tobacco: Never   Substance and Sexual Activity    Alcohol use: Yes     Comment: rarely    Drug use: Yes     Types: Marijuana    Sexual activity: Yes     Partners: Male          Allergies:   Review of patient's allergies indicates:   Allergen Reactions    Amitriptyline Other (See Comments)     Felt out of it. Eye would not open.     Objective:     Vitals:    07/22/25 1528   Weight: 81.6 kg (180 lb)   Height: 5' 6" (1.676 m)         Physical Examination:   Physical Exam  Vitals and nursing note reviewed.   Constitutional:       Appearance: Normal appearance.   HENT:      Head: Normocephalic and atraumatic.   Neurological:      General: No focal deficit present.      Mental Status: She is alert and oriented to person, place, and time.   Psychiatric:         Mood and Affect: Mood normal.         Behavior: Behavior normal.         Thought Content: Thought content normal.         Judgment: Judgment normal.           Medications:     Medication List with Changes/Refills   Current Medications    ALPRAZOLAM (XANAX) 0.25 MG TABLET    Take 1 tablet (0.25 mg total) by mouth 2 (two) times daily as needed for Anxiety.    FLUTICASONE PROPIONATE (FLONASE) 50 MCG/ACTUATION NASAL SPRAY    2 sprays by Each Nostril route daily as needed.   Changed and/or Refilled Medications    Modified Medication Previous Medication    SERDEXMETHYLPHEN-DEXMETHYLPHEN (AZSTARYS) 39.2 MG- 7.8 MG CAP serdexmethylphen-dexmethylphen (AZSTARYS) 39.2 mg- 7.8 mg Cap       Take 1 capsule by mouth Daily.    Take 1 capsule by mouth Daily.     Assessment and Plan     Assessment & Plan    F90.9 ADHD, unspecified ADHD type  F41.1 Generalized anxiety " disorder    IMPRESSION:  - Azstarys and Xanax effectively managing ADHD and anxiety.    ATTENTION DEFICIT HYPERACTIVITY DISORDER (ADHD), UNSPECIFIED ADHD TYPE:  - Continued Azstarys at current dose for ADD.    GENERALIZED ANXIETY DISORDER:  - Continued Xanax as needed for anxiety.          1. Attention deficit hyperactivity disorder (ADHD), unspecified ADHD type  Overview:  DX as child  Previously Vyvanse 30mg daily    01/10/2024 - increase Vyvanse to 40 mg daily    01/11/2024 - Vyvanse over $300, new RX sent for Azstarys 39.2/7.8 mg daily    Orders:  -     serdexmethylphen-dexmethylphen (AZSTARYS) 39.2 mg- 7.8 mg Cap; Take 1 capsule by mouth Daily.  Dispense: 30 capsule; Refill: 0    2. Generalized anxiety disorder  Overview:  Lexapro and Viibryd in past    Current - Xanax 0.25 mg BID prn     03/06/2023 - Viibryd 20 mg daily (discontinued June 2024)                Follow Up:   Follow up in about 3 months (around 10/22/2025) for Virtual Visit.    I spent greater than 30 minutes today both in chart review and greater than 50% of that time in discussion with the patient regarding health maintenance, diagnoses, diagnostic tests, medications, treatments, symptom management, expected results and adverse effects. Patient verbalized understanding and all questions were answered.      This note was generated with the assistance of ambient listening technology. Verbal consent was obtained by the patient and accompanying visitor(s) for the recording of patient appointment to facilitate this note. I attest to having reviewed and edited the generated note for accuracy, though some syntax or spelling errors may persist. Please contact the author of this note for any clarification.

## 2025-07-31 ENCOUNTER — PATIENT MESSAGE (OUTPATIENT)
Dept: FAMILY MEDICINE | Facility: CLINIC | Age: 25
End: 2025-07-31
Payer: COMMERCIAL

## 2025-09-03 DIAGNOSIS — F90.9 ATTENTION DEFICIT HYPERACTIVITY DISORDER (ADHD), UNSPECIFIED ADHD TYPE: ICD-10-CM

## 2025-09-04 RX ORDER — SERDEXMETHYLPHENIDATE AND DEXMETHYLPHENIDATE 7.8; 39.2 MG/1; MG/1
1 CAPSULE ORAL DAILY
Qty: 30 CAPSULE | Refills: 0 | Status: SHIPPED | OUTPATIENT
Start: 2025-09-04